# Patient Record
Sex: MALE | Race: WHITE | Employment: UNEMPLOYED | URBAN - METROPOLITAN AREA
[De-identification: names, ages, dates, MRNs, and addresses within clinical notes are randomized per-mention and may not be internally consistent; named-entity substitution may affect disease eponyms.]

---

## 2019-02-25 ENCOUNTER — ANESTHESIA EVENT (OUTPATIENT)
Dept: SURGERY | Age: 49
DRG: 718 | End: 2019-02-25
Payer: COMMERCIAL

## 2019-02-25 ENCOUNTER — ANESTHESIA (OUTPATIENT)
Dept: SURGERY | Age: 49
DRG: 718 | End: 2019-02-25
Payer: COMMERCIAL

## 2019-02-25 ENCOUNTER — APPOINTMENT (OUTPATIENT)
Dept: GENERAL RADIOLOGY | Age: 49
DRG: 718 | End: 2019-02-25
Attending: UROLOGY
Payer: COMMERCIAL

## 2019-02-25 ENCOUNTER — HOSPITAL ENCOUNTER (INPATIENT)
Age: 49
LOS: 1 days | Discharge: HOME | DRG: 718 | End: 2019-02-26
Attending: EMERGENCY MEDICINE | Admitting: HOSPITALIST
Payer: COMMERCIAL

## 2019-02-25 ENCOUNTER — APPOINTMENT (OUTPATIENT)
Dept: ULTRASOUND IMAGING | Age: 49
DRG: 718 | End: 2019-02-25
Attending: EMERGENCY MEDICINE
Payer: COMMERCIAL

## 2019-02-25 DIAGNOSIS — Q64.33: ICD-10-CM

## 2019-02-25 DIAGNOSIS — N45.3 ORCHITIS AND EPIDIDYMITIS: Primary | ICD-10-CM

## 2019-02-25 PROBLEM — N39.0 UTI (URINARY TRACT INFECTION): Status: ACTIVE | Noted: 2019-02-25

## 2019-02-25 LAB
ANION GAP SERPL CALC-SCNC: 8 MMOL/L (ref 3–18)
APPEARANCE UR: CLEAR
BACTERIA URNS QL MICRO: ABNORMAL /HPF
BASOPHILS # BLD: 0 K/UL (ref 0–0.1)
BASOPHILS NFR BLD: 0 % (ref 0–2)
BILIRUB UR QL: NEGATIVE
BUN SERPL-MCNC: 15 MG/DL (ref 7–18)
BUN/CREAT SERPL: 13 (ref 12–20)
CALCIUM SERPL-MCNC: 9.1 MG/DL (ref 8.5–10.1)
CHLORIDE SERPL-SCNC: 103 MMOL/L (ref 100–108)
CO2 SERPL-SCNC: 25 MMOL/L (ref 21–32)
COLOR UR: ABNORMAL
CREAT SERPL-MCNC: 1.13 MG/DL (ref 0.6–1.3)
DIFFERENTIAL METHOD BLD: ABNORMAL
EOSINOPHIL # BLD: 0.2 K/UL (ref 0–0.4)
EOSINOPHIL NFR BLD: 2 % (ref 0–5)
EPITH CASTS URNS QL MICRO: ABNORMAL /LPF (ref 0–5)
ERYTHROCYTE [DISTWIDTH] IN BLOOD BY AUTOMATED COUNT: 12.5 % (ref 11.6–14.5)
GLUCOSE SERPL-MCNC: 126 MG/DL (ref 74–99)
GLUCOSE UR STRIP.AUTO-MCNC: NEGATIVE MG/DL
HCT VFR BLD AUTO: 48.5 % (ref 36–48)
HGB BLD-MCNC: 16.4 G/DL (ref 13–16)
HGB UR QL STRIP: NEGATIVE
KETONES UR QL STRIP.AUTO: NEGATIVE MG/DL
LACTATE BLD-SCNC: 1.53 MMOL/L (ref 0.4–2)
LEUKOCYTE ESTERASE UR QL STRIP.AUTO: ABNORMAL
LYMPHOCYTES # BLD: 1 K/UL (ref 0.9–3.6)
LYMPHOCYTES NFR BLD: 11 % (ref 21–52)
MCH RBC QN AUTO: 29.4 PG (ref 24–34)
MCHC RBC AUTO-ENTMCNC: 33.8 G/DL (ref 31–37)
MCV RBC AUTO: 87.1 FL (ref 74–97)
MONOCYTES # BLD: 1 K/UL (ref 0.05–1.2)
MONOCYTES NFR BLD: 11 % (ref 3–10)
NEUTS SEG # BLD: 6.9 K/UL (ref 1.8–8)
NEUTS SEG NFR BLD: 76 % (ref 40–73)
NITRITE UR QL STRIP.AUTO: NEGATIVE
PH UR STRIP: 5.5 [PH] (ref 5–8)
PLATELET # BLD AUTO: 248 K/UL (ref 135–420)
PMV BLD AUTO: 9.5 FL (ref 9.2–11.8)
POTASSIUM SERPL-SCNC: 4.1 MMOL/L (ref 3.5–5.5)
PROT UR STRIP-MCNC: NEGATIVE MG/DL
RBC # BLD AUTO: 5.57 M/UL (ref 4.7–5.5)
RBC #/AREA URNS HPF: NEGATIVE /HPF (ref 0–5)
SODIUM SERPL-SCNC: 136 MMOL/L (ref 136–145)
SP GR UR REFRACTOMETRY: 1.01 (ref 1–1.03)
UROBILINOGEN UR QL STRIP.AUTO: 0.2 EU/DL (ref 0.2–1)
WBC # BLD AUTO: 9.1 K/UL (ref 4.6–13.2)
WBC URNS QL MICRO: ABNORMAL /HPF (ref 0–4)

## 2019-02-25 PROCEDURE — 76060000032 HC ANESTHESIA 0.5 TO 1 HR: Performed by: UROLOGY

## 2019-02-25 PROCEDURE — 77030027138 HC INCENT SPIROMETER -A

## 2019-02-25 PROCEDURE — 0TJB8ZZ INSPECTION OF BLADDER, VIA NATURAL OR ARTIFICIAL OPENING ENDOSCOPIC: ICD-10-PCS | Performed by: UROLOGY

## 2019-02-25 PROCEDURE — 80048 BASIC METABOLIC PNL TOTAL CA: CPT

## 2019-02-25 PROCEDURE — 65660000000 HC RM CCU STEPDOWN

## 2019-02-25 PROCEDURE — 74011250636 HC RX REV CODE- 250/636

## 2019-02-25 PROCEDURE — 77030018832 HC SOL IRR H20 ICUM -A: Performed by: UROLOGY

## 2019-02-25 PROCEDURE — 83605 ASSAY OF LACTIC ACID: CPT

## 2019-02-25 PROCEDURE — 0T7D0ZZ DILATION OF URETHRA, OPEN APPROACH: ICD-10-PCS | Performed by: UROLOGY

## 2019-02-25 PROCEDURE — 74011000250 HC RX REV CODE- 250: Performed by: EMERGENCY MEDICINE

## 2019-02-25 PROCEDURE — 77030020782 HC GWN BAIR PAWS FLX 3M -B: Performed by: UROLOGY

## 2019-02-25 PROCEDURE — 77030034696 HC CATH URETH FOL 2W BARD -A: Performed by: UROLOGY

## 2019-02-25 PROCEDURE — 76870 US EXAM SCROTUM: CPT

## 2019-02-25 PROCEDURE — 87086 URINE CULTURE/COLONY COUNT: CPT

## 2019-02-25 PROCEDURE — 77030012961 HC IRR KT CYSTO/TUR ICUM -A: Performed by: UROLOGY

## 2019-02-25 PROCEDURE — 77030031139 HC SUT VCRL2 J&J -A: Performed by: UROLOGY

## 2019-02-25 PROCEDURE — 51798 US URINE CAPACITY MEASURE: CPT

## 2019-02-25 PROCEDURE — 77030013553 HC DIL URETH MEATAL COOK -C: Performed by: UROLOGY

## 2019-02-25 PROCEDURE — 76010000138 HC OR TIME 0.5 TO 1 HR: Performed by: UROLOGY

## 2019-02-25 PROCEDURE — 74011250636 HC RX REV CODE- 250/636: Performed by: HOSPITALIST

## 2019-02-25 PROCEDURE — 81001 URINALYSIS AUTO W/SCOPE: CPT

## 2019-02-25 PROCEDURE — 99283 EMERGENCY DEPT VISIT LOW MDM: CPT

## 2019-02-25 PROCEDURE — 0T9B70Z DRAINAGE OF BLADDER WITH DRAINAGE DEVICE, VIA NATURAL OR ARTIFICIAL OPENING: ICD-10-PCS | Performed by: UROLOGY

## 2019-02-25 PROCEDURE — 76210000006 HC OR PH I REC 0.5 TO 1 HR: Performed by: UROLOGY

## 2019-02-25 PROCEDURE — 74011000250 HC RX REV CODE- 250

## 2019-02-25 PROCEDURE — 87040 BLOOD CULTURE FOR BACTERIA: CPT

## 2019-02-25 PROCEDURE — 51703 INSERT BLADDER CATH COMPLEX: CPT

## 2019-02-25 PROCEDURE — 77030010509 HC AIRWY LMA MSK TELE -A: Performed by: ANESTHESIOLOGY

## 2019-02-25 PROCEDURE — 74011250636 HC RX REV CODE- 250/636: Performed by: EMERGENCY MEDICINE

## 2019-02-25 PROCEDURE — 85025 COMPLETE CBC W/AUTO DIFF WBC: CPT

## 2019-02-25 RX ORDER — PROPOFOL 10 MG/ML
INJECTION, EMULSION INTRAVENOUS AS NEEDED
Status: DISCONTINUED | OUTPATIENT
Start: 2019-02-25 | End: 2019-02-25 | Stop reason: HOSPADM

## 2019-02-25 RX ORDER — LEVOFLOXACIN 5 MG/ML
750 INJECTION, SOLUTION INTRAVENOUS EVERY 24 HOURS
Status: DISCONTINUED | OUTPATIENT
Start: 2019-02-25 | End: 2019-02-25

## 2019-02-25 RX ORDER — SODIUM CHLORIDE 9 MG/ML
100 INJECTION, SOLUTION INTRAVENOUS CONTINUOUS
Status: DISCONTINUED | OUTPATIENT
Start: 2019-02-25 | End: 2019-02-26 | Stop reason: HOSPADM

## 2019-02-25 RX ORDER — FENTANYL CITRATE 50 UG/ML
50 INJECTION, SOLUTION INTRAMUSCULAR; INTRAVENOUS
Status: DISCONTINUED | OUTPATIENT
Start: 2019-02-25 | End: 2019-02-25 | Stop reason: HOSPADM

## 2019-02-25 RX ORDER — ONDANSETRON 2 MG/ML
4 INJECTION INTRAMUSCULAR; INTRAVENOUS ONCE
Status: DISCONTINUED | OUTPATIENT
Start: 2019-02-25 | End: 2019-02-25 | Stop reason: HOSPADM

## 2019-02-25 RX ORDER — DOXYCYCLINE 100 MG/1
100 CAPSULE ORAL 2 TIMES DAILY
Qty: 28 CAP | Refills: 0 | Status: SHIPPED | OUTPATIENT
Start: 2019-02-25 | End: 2019-02-26 | Stop reason: SDUPTHER

## 2019-02-25 RX ORDER — DIPHENHYDRAMINE HYDROCHLORIDE 50 MG/ML
12.5 INJECTION, SOLUTION INTRAMUSCULAR; INTRAVENOUS
Status: DISCONTINUED | OUTPATIENT
Start: 2019-02-25 | End: 2019-02-25 | Stop reason: HOSPADM

## 2019-02-25 RX ORDER — LIDOCAINE HYDROCHLORIDE 20 MG/ML
JELLY TOPICAL ONCE
Status: COMPLETED | OUTPATIENT
Start: 2019-02-25 | End: 2019-02-25

## 2019-02-25 RX ORDER — SODIUM CHLORIDE 0.9 % (FLUSH) 0.9 %
5-40 SYRINGE (ML) INJECTION AS NEEDED
Status: DISCONTINUED | OUTPATIENT
Start: 2019-02-25 | End: 2019-02-25 | Stop reason: HOSPADM

## 2019-02-25 RX ORDER — DEXAMETHASONE SODIUM PHOSPHATE 4 MG/ML
INJECTION, SOLUTION INTRA-ARTICULAR; INTRALESIONAL; INTRAMUSCULAR; INTRAVENOUS; SOFT TISSUE AS NEEDED
Status: DISCONTINUED | OUTPATIENT
Start: 2019-02-25 | End: 2019-02-25 | Stop reason: HOSPADM

## 2019-02-25 RX ORDER — SODIUM CHLORIDE, SODIUM LACTATE, POTASSIUM CHLORIDE, CALCIUM CHLORIDE 600; 310; 30; 20 MG/100ML; MG/100ML; MG/100ML; MG/100ML
INJECTION, SOLUTION INTRAVENOUS
Status: DISCONTINUED | OUTPATIENT
Start: 2019-02-25 | End: 2019-02-25 | Stop reason: HOSPADM

## 2019-02-25 RX ORDER — FENTANYL CITRATE 50 UG/ML
INJECTION, SOLUTION INTRAMUSCULAR; INTRAVENOUS AS NEEDED
Status: DISCONTINUED | OUTPATIENT
Start: 2019-02-25 | End: 2019-02-25 | Stop reason: HOSPADM

## 2019-02-25 RX ORDER — ONDANSETRON 2 MG/ML
4 INJECTION INTRAMUSCULAR; INTRAVENOUS
Status: DISCONTINUED | OUTPATIENT
Start: 2019-02-25 | End: 2019-02-26 | Stop reason: HOSPADM

## 2019-02-25 RX ORDER — MIDAZOLAM HYDROCHLORIDE 1 MG/ML
INJECTION, SOLUTION INTRAMUSCULAR; INTRAVENOUS AS NEEDED
Status: DISCONTINUED | OUTPATIENT
Start: 2019-02-25 | End: 2019-02-25 | Stop reason: HOSPADM

## 2019-02-25 RX ORDER — SODIUM CHLORIDE 0.9 % (FLUSH) 0.9 %
5-10 SYRINGE (ML) INJECTION AS NEEDED
Status: DISCONTINUED | OUTPATIENT
Start: 2019-02-25 | End: 2019-02-26 | Stop reason: HOSPADM

## 2019-02-25 RX ORDER — LIDOCAINE HYDROCHLORIDE 20 MG/ML
INJECTION, SOLUTION EPIDURAL; INFILTRATION; INTRACAUDAL; PERINEURAL AS NEEDED
Status: DISCONTINUED | OUTPATIENT
Start: 2019-02-25 | End: 2019-02-25 | Stop reason: HOSPADM

## 2019-02-25 RX ORDER — ONDANSETRON 2 MG/ML
INJECTION INTRAMUSCULAR; INTRAVENOUS AS NEEDED
Status: DISCONTINUED | OUTPATIENT
Start: 2019-02-25 | End: 2019-02-25 | Stop reason: HOSPADM

## 2019-02-25 RX ORDER — OXYCODONE AND ACETAMINOPHEN 5; 325 MG/1; MG/1
1 TABLET ORAL
Status: DISCONTINUED | OUTPATIENT
Start: 2019-02-25 | End: 2019-02-26 | Stop reason: HOSPADM

## 2019-02-25 RX ORDER — FAMOTIDINE 20 MG/1
20 TABLET, FILM COATED ORAL ONCE
Status: DISCONTINUED | OUTPATIENT
Start: 2019-02-25 | End: 2019-02-25 | Stop reason: HOSPADM

## 2019-02-25 RX ORDER — SODIUM CHLORIDE, SODIUM LACTATE, POTASSIUM CHLORIDE, CALCIUM CHLORIDE 600; 310; 30; 20 MG/100ML; MG/100ML; MG/100ML; MG/100ML
100 INJECTION, SOLUTION INTRAVENOUS CONTINUOUS
Status: DISCONTINUED | OUTPATIENT
Start: 2019-02-25 | End: 2019-02-25 | Stop reason: HOSPADM

## 2019-02-25 RX ORDER — GLYCOPYRROLATE 0.2 MG/ML
INJECTION INTRAMUSCULAR; INTRAVENOUS AS NEEDED
Status: DISCONTINUED | OUTPATIENT
Start: 2019-02-25 | End: 2019-02-25 | Stop reason: HOSPADM

## 2019-02-25 RX ORDER — SODIUM CHLORIDE 0.9 % (FLUSH) 0.9 %
5-40 SYRINGE (ML) INJECTION EVERY 8 HOURS
Status: DISCONTINUED | OUTPATIENT
Start: 2019-02-25 | End: 2019-02-25 | Stop reason: HOSPADM

## 2019-02-25 RX ORDER — ACETAMINOPHEN 325 MG/1
650 TABLET ORAL
Status: DISCONTINUED | OUTPATIENT
Start: 2019-02-25 | End: 2019-02-26 | Stop reason: HOSPADM

## 2019-02-25 RX ADMIN — LIDOCAINE HYDROCHLORIDE 100 MG: 20 INJECTION, SOLUTION EPIDURAL; INFILTRATION; INTRACAUDAL; PERINEURAL at 19:42

## 2019-02-25 RX ADMIN — PROPOFOL 200 MG: 10 INJECTION, EMULSION INTRAVENOUS at 19:42

## 2019-02-25 RX ADMIN — ONDANSETRON 4 MG: 2 INJECTION INTRAMUSCULAR; INTRAVENOUS at 19:39

## 2019-02-25 RX ADMIN — CEFEPIME 2 G: 2 INJECTION, POWDER, FOR SOLUTION INTRAVENOUS at 18:50

## 2019-02-25 RX ADMIN — GLYCOPYRROLATE 0.2 MG: 0.2 INJECTION INTRAMUSCULAR; INTRAVENOUS at 19:39

## 2019-02-25 RX ADMIN — SODIUM CHLORIDE 100 ML/HR: 900 INJECTION, SOLUTION INTRAVENOUS at 21:41

## 2019-02-25 RX ADMIN — LIDOCAINE HYDROCHLORIDE: 20 JELLY TOPICAL at 18:10

## 2019-02-25 RX ADMIN — FENTANYL CITRATE 50 MCG: 50 INJECTION, SOLUTION INTRAMUSCULAR; INTRAVENOUS at 19:42

## 2019-02-25 RX ADMIN — DEXAMETHASONE SODIUM PHOSPHATE 4 MG: 4 INJECTION, SOLUTION INTRA-ARTICULAR; INTRALESIONAL; INTRAMUSCULAR; INTRAVENOUS; SOFT TISSUE at 19:43

## 2019-02-25 RX ADMIN — MIDAZOLAM HYDROCHLORIDE 2 MG: 1 INJECTION, SOLUTION INTRAMUSCULAR; INTRAVENOUS at 19:39

## 2019-02-25 RX ADMIN — FENTANYL CITRATE 50 MCG: 50 INJECTION, SOLUTION INTRAMUSCULAR; INTRAVENOUS at 19:56

## 2019-02-25 RX ADMIN — SODIUM CHLORIDE, SODIUM LACTATE, POTASSIUM CHLORIDE, CALCIUM CHLORIDE: 600; 310; 30; 20 INJECTION, SOLUTION INTRAVENOUS at 19:39

## 2019-02-25 NOTE — ED PROVIDER NOTES
EMERGENCY DEPARTMENT HISTORY AND PHYSICAL EXAM 
 
1:01 PM 
 
 
Date: 2/25/2019 Patient Name: Corine Munguia History of Presenting Illness Chief Complaint Patient presents with  Testicle Swelling History Provided By: Patient Additional History (Context): Corine Munguia is a 57 y.o. male presents with acute scrotal swelling onset 1 day. Pain located to right testicle. He states it feels like a \"cyst\". Pain worsened with palpation to scrotum. Admits involvement with heavy lifting and concern for hernia. No pertinent hx or other concerns at this time. PCP: Darby Villeda MD 
 
Chief Complaint: Swelling Duration:  1 day Timing:  acute Location: Right scrotum Quality: cyst-like Severity: none reported Modifying Factors: Pain worsened with palpation. Associated Symptoms: None. Current Outpatient Medications Medication Sig Dispense Refill  metoprolol succinate (TOPROL XL) 100 mg XL tablet Take  by mouth daily.  amLODIPine (NORVASC) 10 mg tablet Take 10 mg by mouth daily.  oxyCODONE-acetaminophen (PERCOCET) 5-325 mg per tablet Take 1 Tab by mouth every four (4) hours as needed for Pain. 10 Tab 0 Past History Past Medical History: 
Past Medical History:  
Diagnosis Date  Hypertension Past Surgical History: 
Past Surgical History:  
Procedure Laterality Date  HX UROLOGICAL Family History: 
History reviewed. No pertinent family history. Social History: 
Social History Tobacco Use  Smoking status: Never Smoker  Smokeless tobacco: Never Used Substance Use Topics  Alcohol use: Yes Comment: occasionally  Drug use: No  
 
 
Allergies: Allergies Allergen Reactions  Ciprofloxacin Nausea and Vomiting Review of Systems Review of Systems Constitutional: Negative for diaphoresis and fever. HENT: Negative for congestion and sore throat. Eyes: Negative for pain and itching. Respiratory: Negative for cough and shortness of breath. Cardiovascular: Negative for chest pain and palpitations. Gastrointestinal: Negative for abdominal pain and diarrhea. Endocrine: Negative for polydipsia and polyuria. Genitourinary: Positive for scrotal swelling. Negative for dysuria, hematuria, penile pain and penile swelling. Musculoskeletal: Negative for arthralgias and myalgias. Skin: Negative for rash and wound. Neurological: Negative for seizures and syncope. Hematological: Does not bruise/bleed easily. Psychiatric/Behavioral: Negative for agitation and hallucinations. Physical Exam  
 
Patient Vitals for the past 12 hrs: 
 Temp Pulse Resp BP SpO2  
02/25/19 1236 98 °F (36.7 °C) 100 16 (!) 146/97 100 % Physical Exam  
Constitutional: He appears well-developed and well-nourished. HENT:  
Head: Normocephalic and atraumatic. Eyes: Conjunctivae are normal. No scleral icterus. Neck: Normal range of motion. Neck supple. No JVD present. Cardiovascular: Normal rate, regular rhythm and intact distal pulses. Pulmonary/Chest: Effort normal. No respiratory distress. Genitourinary: Penis normal. Right testis shows swelling (slight). Right testis shows no mass. Left testis shows no mass. Circumcised. Genitourinary Comments: No focal area of severe tenderness but seems centered at spermatic cord. Musculoskeletal: Normal range of motion. Neurological: He is alert. Skin: Skin is warm and dry. Psychiatric: Judgment and thought content normal.  
Nursing note and vitals reviewed. Diagnostic Study Results Labs - No results found for this or any previous visit (from the past 12 hour(s)). Radiologic Studies -  
US SCROTUM/TESTICLES Final Result IMPRESSION:   
 Markedly heterogeneous left testicle with diminished vascularity compared to  
the contralateral testicle but some slight arterial and venous waveforms are  
 identified in the left testicle. Sequela of prior infarction or  
intermittent/partial torsion are possible. The left epididymis is also  
heterogeneous in appearance. Right epididymal head cyst measuring up to 8 mm. Bilateral hydroceles, right greater than left. Us Scrotum/testicles Result Date: 2/25/2019 SCROTAL ULTRASOUND HISTORY: Right-sided scrotal swelling and pain. COMPARISON: None. TECHNIQUE:  Sonography and spectral Doppler imaging of the scrotal contents was performed. Images were obtained and stored in a permanent archive. RESULT: RIGHT TESTIS:       Size: 4.8 x 2.9 x 3.4  cm. Echotexture: Homogeneous       Microcalcifications: absent       Mass: None. Vascularity: Normal arterial and venous flow with normal spectral waveforms. LEFT TESTIS:      Size:  3.7 x 2.4 x 2.5  cm. Echotexture: Extremely heterogeneous with multiple areas of decreased hypoechogenicity      Microcalcifications: absent      Mass: None. Vascularity: There is less vascularity of the left testicle, but there are some arterial and venous waveforms identified. EPIDIDYMIDES: There is an 8 x 7 x 6 mm right epididymal head cyst. The left epididymis is heterogeneous. HYDROCELE: Bilateral hydroceles, right greater than left. VARICOCELE: none IMPRESSION:  Markedly heterogeneous left testicle with diminished vascularity compared to the contralateral testicle but some slight arterial and venous waveforms are identified in the left testicle. Sequela of prior infarction or intermittent/partial torsion are possible. The left epididymis is also heterogeneous in appearance. Right epididymal head cyst measuring up to 8 mm. Bilateral hydroceles, right greater than left. Medications ordered:  
Medications - No data to display Medical Decision Making Initial Medical Decision Making and DDx: MDM: No mass. No exquisite tenderness.  Focused around epididymis, possibly epididymitis. Doubt testicular torsion or hernia. ED Course: Progress Notes, Reevaluation, and Consults: 
   
 
 
3:36 PM: Imaging returned. Concern for partial torsion. Discussed pt's case with urology. Will come down and evaluate pt. 
 
6:02 PM : Pt care transferred to Dr. Carmencita Coleman  ,ED provider. History of patient complaint(s), available diagnostic reports and current treatment plan has been discussed thoroughly. Bedside rounding on patient occured : no . Intended disposition of patient : Plan to DC with Doxycycline and indwelling catheter. Pending diagnostics reports and/or labs (please list): Urinary catheter and labs. I am the first provider for this patient. I reviewed the vital signs, available nursing notes, past medical history, past surgical history, family history and social history. Vital Signs-Reviewed the patient's vital signs. Pulse Oximetry Analysis - 100% on RA. Wnl. Records Reviewed: Nursing Notes and Old Medical Records (Time of Review: 1:01 PM) Diagnosis Clinical Impression: No diagnosis found. Disposition:  
 
Follow-up Information None Medication List  
  
ASK your doctor about these medications NORVASC 10 mg tablet Generic drug:  amLODIPine 
  
oxyCODONE-acetaminophen 5-325 mg per tablet Commonly known as:  PERCOCET Take 1 Tab by mouth every four (4) hours as needed for Pain. TOPROL  mg tablet Generic drug:  metoprolol succinate 
  
  
 
_______________________________ Attestations: 
Scribe Attestation Alfredo Christie acting as a scribe for and in the presence of Nova Olivo MD     
February 25, 2019 at 1:09 PM 
    
Provider Attestation:     
I personally performed the services described in the documentation, reviewed the documentation, as recorded by the scribe in my presence, and it accurately and completely records my words and actions.  February 25, 2019 at 1:09 PM - Nova Olivo MD   
 _______________________________

## 2019-02-25 NOTE — ED NOTES
Pt reports minor irritation from swollen scrotum, states it isn't really painful - just annoying, sitting in position of comfort.

## 2019-02-25 NOTE — ED NOTES
1800 :Pt care assumed from Yvette Douglas , ED provider. Pt complaint(s), current treatment plan, progression and available diagnostic results have been discussed thoroughly. Rounding occurred: Yes Intended Disposition: TBD Pending diagnostic reports and/or labs (please list): going to OR for suprapubic catheter placement. Progress Notes:  
Consult:  Discussed care with Dr. Adrianne Fortune, Urology. Standard discussion; including history of patients chief complaint, available diagnostic results, and treatment course. Going to take the patient to the OR for a suprapubic catheter placement. Clinical impression will be epididymal orchitis and complex UTI. Would like the patient started on ceftriaxone and admitted to the hospitalist.  
 
6:28 PM - Consult:  Discussed care with Dr. Maryann Álvarez, Hospitalist. Standard discussion; including history of patients chief complaint, available diagnostic results, and treatment course. Will accept patient for admission. Decision to hospitalize the patient made at 6:29 PM 2/25/2019 Disposition:  
Admit Attestations: 
 
Scribe Attestation Gordon Malcolm acting as a scribe for and in the presence of [employer] February 25, 2019 at 6:19 PM 
    
Provider Attestation:     
I personally performed the services described in the documentation, reviewed the documentation, as recorded by the scribe in my presence, and it accurately and completely records my words and actions.  February 25, 2019 at 6:19 PM - [employer], DO

## 2019-02-26 VITALS
HEIGHT: 72 IN | OXYGEN SATURATION: 96 % | SYSTOLIC BLOOD PRESSURE: 115 MMHG | RESPIRATION RATE: 16 BRPM | HEART RATE: 75 BPM | BODY MASS INDEX: 26.82 KG/M2 | DIASTOLIC BLOOD PRESSURE: 67 MMHG | WEIGHT: 198 LBS | TEMPERATURE: 98.6 F

## 2019-02-26 LAB
ALBUMIN SERPL-MCNC: 3.5 G/DL (ref 3.4–5)
ALBUMIN/GLOB SERPL: 0.9 {RATIO} (ref 0.8–1.7)
ALP SERPL-CCNC: 85 U/L (ref 45–117)
ALT SERPL-CCNC: 40 U/L (ref 16–61)
ANION GAP SERPL CALC-SCNC: 5 MMOL/L (ref 3–18)
APTT PPP: 28.6 SEC (ref 23–36.4)
AST SERPL-CCNC: 20 U/L (ref 15–37)
BILIRUB SERPL-MCNC: 0.8 MG/DL (ref 0.2–1)
BUN SERPL-MCNC: 15 MG/DL (ref 7–18)
BUN/CREAT SERPL: 15 (ref 12–20)
CALCIUM SERPL-MCNC: 8.6 MG/DL (ref 8.5–10.1)
CHLORIDE SERPL-SCNC: 106 MMOL/L (ref 100–108)
CHOLEST SERPL-MCNC: 212 MG/DL
CO2 SERPL-SCNC: 26 MMOL/L (ref 21–32)
CREAT SERPL-MCNC: 1.03 MG/DL (ref 0.6–1.3)
ERYTHROCYTE [DISTWIDTH] IN BLOOD BY AUTOMATED COUNT: 12.5 % (ref 11.6–14.5)
GLOBULIN SER CALC-MCNC: 4 G/DL (ref 2–4)
GLUCOSE SERPL-MCNC: 130 MG/DL (ref 74–99)
HCT VFR BLD AUTO: 44.9 % (ref 36–48)
HDLC SERPL-MCNC: 62 MG/DL (ref 40–60)
HDLC SERPL: 3.4 {RATIO} (ref 0–5)
HGB BLD-MCNC: 15.6 G/DL (ref 13–16)
LACTATE SERPL-SCNC: 1.1 MMOL/L (ref 0.4–2)
LDLC SERPL CALC-MCNC: 127 MG/DL (ref 0–100)
LIPID PROFILE,FLP: ABNORMAL
MCH RBC QN AUTO: 30.3 PG (ref 24–34)
MCHC RBC AUTO-ENTMCNC: 34.7 G/DL (ref 31–37)
MCV RBC AUTO: 87.2 FL (ref 74–97)
PLATELET # BLD AUTO: 197 K/UL (ref 135–420)
PMV BLD AUTO: 9.5 FL (ref 9.2–11.8)
POTASSIUM SERPL-SCNC: 4.7 MMOL/L (ref 3.5–5.5)
PROT SERPL-MCNC: 7.5 G/DL (ref 6.4–8.2)
RBC # BLD AUTO: 5.15 M/UL (ref 4.7–5.5)
SODIUM SERPL-SCNC: 137 MMOL/L (ref 136–145)
TRIGL SERPL-MCNC: 115 MG/DL (ref ?–150)
VLDLC SERPL CALC-MCNC: 23 MG/DL
WBC # BLD AUTO: 6.6 K/UL (ref 4.6–13.2)

## 2019-02-26 PROCEDURE — 74011250636 HC RX REV CODE- 250/636: Performed by: EMERGENCY MEDICINE

## 2019-02-26 PROCEDURE — 85027 COMPLETE CBC AUTOMATED: CPT

## 2019-02-26 PROCEDURE — 85730 THROMBOPLASTIN TIME PARTIAL: CPT

## 2019-02-26 PROCEDURE — 36415 COLL VENOUS BLD VENIPUNCTURE: CPT

## 2019-02-26 PROCEDURE — 74011000250 HC RX REV CODE- 250: Performed by: EMERGENCY MEDICINE

## 2019-02-26 PROCEDURE — 80053 COMPREHEN METABOLIC PANEL: CPT

## 2019-02-26 PROCEDURE — 80061 LIPID PANEL: CPT

## 2019-02-26 RX ORDER — OXYCODONE AND ACETAMINOPHEN 5; 325 MG/1; MG/1
1 TABLET ORAL
Qty: 10 TAB | Refills: 0 | Status: SHIPPED | OUTPATIENT
Start: 2019-02-26 | End: 2019-03-01

## 2019-02-26 RX ORDER — DOXYCYCLINE 100 MG/1
100 CAPSULE ORAL 2 TIMES DAILY
Qty: 28 CAP | Refills: 0 | Status: SHIPPED | OUTPATIENT
Start: 2019-02-26 | End: 2019-03-12

## 2019-02-26 RX ORDER — OXYCODONE AND ACETAMINOPHEN 5; 325 MG/1; MG/1
1 TABLET ORAL
Qty: 10 TAB | Refills: 0 | Status: SHIPPED | OUTPATIENT
Start: 2019-02-26 | End: 2019-02-26 | Stop reason: SDUPTHER

## 2019-02-26 RX ADMIN — CEFEPIME 2 G: 2 INJECTION, POWDER, FOR SOLUTION INTRAVENOUS at 10:03

## 2019-02-26 RX ADMIN — CEFEPIME 2 G: 2 INJECTION, POWDER, FOR SOLUTION INTRAVENOUS at 02:36

## 2019-02-26 NOTE — H&P
HISTORY & PHYSICAL Patient: Florence Sierra MRN: [id number]  CSN: [id number] YOB: 1970  Age: 57 y.o. Sex: male DOA: 2/25/2019 LOS:  LOS: 0 days DOA: 2/25/2019 Assessment/Plan Active Problems: 
  Orchitis and epididymitis (2/25/2019) UTI (urinary tract infection) (2/25/2019) Plan: 1. UTI - IV Abx , IVF 2. Orchitis with R testicular swelling  - IV Abx , Urology consulted 3. Urinary tract obstruction with meatal stenosis - will get Suprapubic cath per urology 4. H/o HTN - holding BP meds since his BP is running low DVT Px - SCD  
FC Severity of Signs & Symptoms -- Moderate Risk of adverse events -- Moderate Current Medical Rx Plan - As Above Patient history & comorbidities - Per HPI Discharge Plan -- Home Risk for Readmission -- Low HPI:  
 
Florence Sierra is a 57 y.o. male who is being admitted for Orchitis - R testicular swelling & UTI & also urinary tract obstruction. Pt came to the ER today for R testicular swelling & pain -- mentions it started earlier today & he says he wasn't having difficulty urinating but in the ER bladder volume post void was 680ml He was seen by urology & noted to have orchitis with UTI Pt mentions he has had urinary tract reconstructive surg in the past >16 yrs ago He also has a h/o HTN Per urology pt will get Suprapubic cath placed today Pt is being admitted to the hosp for further eval  
 
Past Medical History:  
Diagnosis Date  Hypertension Past Surgical History:  
Procedure Laterality Date  HX UROLOGICAL History reviewed. No pertinent family history. Social History Socioeconomic History  Marital status: LEGALLY  Spouse name: Not on file  Number of children: Not on file  Years of education: Not on file  Highest education level: Not on file Tobacco Use  Smoking status: Never Smoker  Smokeless tobacco: Never Used Substance and Sexual Activity  Alcohol use: Yes Comment: occasionally  Drug use: No  
 
 
Prior to Admission medications Medication Sig Start Date End Date Taking? Authorizing Provider  
doxycycline (MONODOX) 100 mg capsule Take 1 Cap by mouth two (2) times a day for 14 days. 2/25/19 3/11/19 Yes Ken TOBIAS MD  
metoprolol succinate (TOPROL XL) 100 mg XL tablet Take  by mouth daily. Yes Blanche Lundy MD  
amLODIPine (NORVASC) 10 mg tablet Take 10 mg by mouth daily. Yes Blanche Lundy MD  
 
 
Allergies Allergen Reactions  Ciprofloxacin Nausea and Vomiting Review of Systems A comprehensive review of systems was negative except for that written in the History of Present Illness. Physical Exam:  
  
Visit Vitals /78 (BP 1 Location: Left arm, BP Patient Position: At rest) Pulse 82 Temp 97.1 °F (36.2 °C) Resp 17 Ht 6' (1.829 m) Wt 89.8 kg (198 lb) SpO2 100% BMI 26.85 kg/m² Physical Exam: 
 
Gen: In general, this is a well nourished male in no acute distress HEENT: Sclerae nonicteric. Oral mucous membranes moist. Dentition normal 
Neck: Supple with midline trachea. CV: RRR without murmur or rub appreciated. Resp:Respirations are unlabored without use of accessory muscles. Lung fields B/L without wheezes or rhonchi. Abd: Soft, nontender, nondistended. Extrem: Extremities are warm, without cyanosis or clubbing. No pitting pretibial edema. Skin: Warm, no visible rashes. Neuro: Patient is alert, oriented, and cooperative. No obvious focal defects. Moves all 4 extremities. Labs Reviewed: 
 
Recent Results (from the past 24 hour(s)) URINALYSIS W/ RFLX MICROSCOPIC Collection Time: 02/25/19  5:28 PM  
Result Value Ref Range Color DARK YELLOW Appearance CLEAR Specific gravity 1.011 1.005 - 1.030    
 pH (UA) 5.5 5.0 - 8.0 Protein NEGATIVE  NEG mg/dL Glucose NEGATIVE  NEG mg/dL Ketone NEGATIVE  NEG mg/dL Bilirubin NEGATIVE  NEG Blood NEGATIVE  NEG Urobilinogen 0.2 0.2 - 1.0 EU/dL Nitrites NEGATIVE  NEG Leukocyte Esterase MODERATE (A) NEG URINE MICROSCOPIC ONLY Collection Time: 02/25/19  5:28 PM  
Result Value Ref Range WBC 20 to 35 0 - 4 /hpf  
 RBC NEGATIVE  0 - 5 /hpf Epithelial cells FEW 0 - 5 /lpf Bacteria FEW (A) NEG /hpf METABOLIC PANEL, BASIC Collection Time: 02/25/19  5:40 PM  
Result Value Ref Range Sodium 136 136 - 145 mmol/L Potassium 4.1 3.5 - 5.5 mmol/L Chloride 103 100 - 108 mmol/L  
 CO2 25 21 - 32 mmol/L Anion gap 8 3.0 - 18 mmol/L Glucose 126 (H) 74 - 99 mg/dL BUN 15 7.0 - 18 MG/DL Creatinine 1.13 0.6 - 1.3 MG/DL  
 BUN/Creatinine ratio 13 12 - 20 GFR est AA >60 >60 ml/min/1.73m2 GFR est non-AA >60 >60 ml/min/1.73m2 Calcium 9.1 8.5 - 10.1 MG/DL  
CBC WITH AUTOMATED DIFF Collection Time: 02/25/19  5:40 PM  
Result Value Ref Range WBC 9.1 4.6 - 13.2 K/uL  
 RBC 5.57 (H) 4.70 - 5.50 M/uL  
 HGB 16.4 (H) 13.0 - 16.0 g/dL HCT 48.5 (H) 36.0 - 48.0 % MCV 87.1 74.0 - 97.0 FL  
 MCH 29.4 24.0 - 34.0 PG  
 MCHC 33.8 31.0 - 37.0 g/dL  
 RDW 12.5 11.6 - 14.5 % PLATELET 909 269 - 319 K/uL MPV 9.5 9.2 - 11.8 FL  
 NEUTROPHILS 76 (H) 40 - 73 % LYMPHOCYTES 11 (L) 21 - 52 % MONOCYTES 11 (H) 3 - 10 % EOSINOPHILS 2 0 - 5 % BASOPHILS 0 0 - 2 %  
 ABS. NEUTROPHILS 6.9 1.8 - 8.0 K/UL  
 ABS. LYMPHOCYTES 1.0 0.9 - 3.6 K/UL  
 ABS. MONOCYTES 1.0 0.05 - 1.2 K/UL  
 ABS. EOSINOPHILS 0.2 0.0 - 0.4 K/UL  
 ABS. BASOPHILS 0.0 0.0 - 0.1 K/UL  
 DF AUTOMATED    
POC LACTIC ACID Collection Time: 02/25/19  6:57 PM  
Result Value Ref Range Lactic Acid (POC) 1.53 0.40 - 2.00 mmol/L Imaging Reviewed: 
 
US Scrotum & testis IMPRESSION:  
 Markedly heterogeneous left testicle with diminished vascularity compared to 
the contralateral testicle but some slight arterial and venous waveforms are 
 identified in the left testicle. Sequela of prior infarction or 
intermittent/partial torsion are possible. The left epididymis is also 
heterogeneous in appearance.  
  
 Right epididymal head cyst measuring up to 8 mm.  
  
Bilateral hydroceles, right greater than left.  
 
 
 
Holly Dawkins MD 
2/25/2019, 7:19 PM

## 2019-02-26 NOTE — PERIOP NOTES
TRANSFER - OUT REPORT: 
 
Verbal report given to [address] on Nurys Cornea  being transferred to [facility] for routine post - op Report consisted of patients Situation, Background, Assessment and  
Recommendations(SBAR). Information from the following report(s) SBAR, OR Summary, Procedure Summary, Intake/Output, MAR and Recent Results was reviewed with the receiving nurse. Lines:  
Peripheral IV 02/25/19 Right;Upper Arm (Active) Site Assessment Clean, dry, & intact 2/25/2019  8:31 PM  
Phlebitis Assessment 0 2/25/2019  8:31 PM  
Infiltration Assessment 0 2/25/2019  8:31 PM  
Dressing Status Clean, dry, & intact 2/25/2019  8:31 PM  
Dressing Type Transparent 2/25/2019  8:31 PM  
Hub Color/Line Status Pink; Infusing;Patent 2/25/2019  8:31 PM  
  
 
Opportunity for questions and clarification was provided. Patient transported with: 
 Registered Nurse

## 2019-02-26 NOTE — PROGRESS NOTES
[address] [facility], [facility] Phone: [phone] Urology Daily Progress Note Patient Active Problem List  
Diagnosis Code  Acute urinary retention R33.8  Urinary retention R33.9  Meatal stenosis [id number]  Orchitis and epididymitis N45.3  
 UTI (urinary tract infection) N39.0 Assessment & Plan Assessment Epididymo-orchitis - On cefepime UTI - afebrile, VSS Urinary retention- with > 600mL in bladder, unable to place francisco at bedside patient doesn't tolerate Meatal Stenosis- Reoccurred. S/p Meatotomy 2/25/19  
          - s/p Blandy meatoplasty in 2003 by Dr. Naif Boyle and s/p dialation in 2014 Hx of Urinary retention with JOELLE. Patient is hypertensive and Tachycardic 
  
Plan: 
Greatly appreciate medicine admitting and managing patient Ucx and Bcx pending. Maintain Francisco x 3 days. Will make arrangements for VT as outpt Ok to discharge home from a urological standpoint on 2 weeks of doxy 100 mg BID Needs f/up with our reconstructive team with urine flow and possible RUG in 6 weeks Follow Up arranged: YES message sent to Shira Quiet to arrange f/up with Recon in 6 weeks and for VT in 3 days Will sign off at this time. Please contact with any questions or concerns Bonnie Young [facility] Pager # [phone] Available M-F 7:30- 5pm .  After 5pm and weekends please call answering service at [phone] Subjective \"I feel great. When can I go home. Only pain if tug on francisco\" Objective PHYSICAL EXAMINATION:  
Visit Vitals /71 Pulse 77 Temp 97.7 °F (36.5 °C) Resp 16 Ht 6' (1.829 m) Wt 198 lb (89.8 kg) SpO2 95% BMI 26.85 kg/m²  
 
  
Constitutional: Well developed, well nourished male. No acute distress. HEENT: Normocephalic, Atraumatic CV:  RRR Pulm: No respiratory distress or difficulties breathing GI:  No abdominal masses or tenderness. :  No CVA tenderness. Sahni- Draining clear yellow urine ANKIT:Perineum normal to visual inspection, no erythema or irritation: Patient deferred rectal exam  
SCROTUM:  No scrotal rash or lesions noticed. Normal bilateral testes and epididymis. PENIS: incision healing well, sutures intact No urethral discharge. Circumcised Skin: No evidence of jaundice. Normal color Neuro/Psych:  Alert and oriented. Affect appropriate. Lymphatic:   No inguinal lymphadenopathy MSK: FROM 
 
 
 
REVIEW OF LABS AND IMAGING:   
 
 
Labs:  
 
Labs: Results:  
Chemistry Recent Labs  
  02/26/19 
0016 02/25/19 
1740 * 126*  136  
K 4.7 4.1  103 CO2 26 25 BUN 15 15 CREA 1.03 1.13  
CA 8.6 9.1 AGAP 5 8 BUCR 15 13 AP 85  --   
TP 7.5  --   
ALB 3.5  --   
GLOB 4.0  --   
AGRAT 0.9  --   
  
CBC w/Diff Recent Labs  
  02/26/19 0016 02/25/19 1740 WBC 6.6 9.1  
RBC 5.15 5.57* HGB 15.6 16.4* HCT 44.9 48.5*  248 GRANS  --  76* LYMPH  --  11* EOS  --  2 Cultures Recent Labs  
  02/25/19 1835 02/25/19 1830 CULT NO GROWTH AFTER 12 HOURS NO GROWTH AFTER 12 HOURS All Micro Results Procedure Component Value Units Date/Time CULTURE, BLOOD [[id number]] Collected:  02/25/19 1835 Order Status:  Completed Specimen:  Blood Updated:  02/26/19 0741 Special Requests: NO SPECIAL REQUESTS Culture result: NO GROWTH AFTER 12 HOURS     
 CULTURE, BLOOD [[id number]] Collected:  02/25/19 1830 Order Status:  Completed Specimen:  Blood Updated:  02/26/19 0741 Special Requests: NO SPECIAL REQUESTS Culture result: NO GROWTH AFTER 12 HOURS     
 CULTURE, URINE [[id number]] Collected:  02/25/19 1728 Order Status:  Completed Specimen:  Clean catch Updated:  02/25/19 1836 Urinalysis Color Date Value Ref Range Status 02/25/2019 DARK YELLOW   Final  
 
Appearance Date Value Ref Range Status 02/25/2019 CLEAR   Final  
 
Specific gravity Date Value Ref Range Status 02/25/2019 1.011 1.005 - 1.030   Final  
 
pH (UA) Date Value Ref Range Status 02/25/2019 5.5 5.0 - 8.0   Final  
 
Protein Date Value Ref Range Status 02/25/2019 NEGATIVE  NEG mg/dL Final  
 
Ketone Date Value Ref Range Status 02/25/2019 NEGATIVE  NEG mg/dL Final  
 
Bilirubin Date Value Ref Range Status 02/25/2019 NEGATIVE  NEG   Final  
 
Blood Date Value Ref Range Status 02/25/2019 NEGATIVE  NEG   Final  
 
Urobilinogen Date Value Ref Range Status 02/25/2019 0.2 0.2 - 1.0 EU/dL Final  
 
Nitrites Date Value Ref Range Status 02/25/2019 NEGATIVE  NEG   Final  
 
Leukocyte Esterase Date Value Ref Range Status 02/25/2019 MODERATE (A) NEG   Final  
 
Potassium Date Value Ref Range Status 02/26/2019 4.7 3.5 - 5.5 mmol/L Final  
 
Creatinine Date Value Ref Range Status 02/26/2019 1.03 0.6 - 1.3 MG/DL Final  
 
BUN Date Value Ref Range Status 02/26/2019 15 7.0 - 18 MG/DL Final  
  
PSA No results for input(s): PSA in the last 72 hours. Coagulation Lab Results Component Value Date/Time  
 aPTT 28.6 02/26/2019 12:16 AM

## 2019-02-26 NOTE — ED NOTES
Pt sent to OR, sent with levaquin dose, lactic completed, belongings with family, Dr Scar Mora saw patient.

## 2019-02-26 NOTE — PROGRESS NOTES
Discharge: 
 
Patient will discharge home today (2/26/2019). Patient has no home health orders in place at the time of discharge. Patient's father will transport home at the time of discharge. There are no other care manager concerns regarding this discharge. This writer will continue to closely monitor for discharge planning to ensure a safe discharge home from [facility]. Lemmie M. Tressia Klinefelter. [facility] Care Manager Pager#: [phone]

## 2019-02-26 NOTE — DISCHARGE SUMMARY
[facility]  [facility]       Patient: Garima Rocha Age: 57 y.o. : 1970 MR#: [id number]: [id number]  PCP on record: Brain Monique MD  Admit date: 2019  Discharge date: 2019    Disposition:    []Home   []Home with Home Health   []SNF/NH   []Rehab   [x]Home with family   []Alternate Facility:____________________    Admission Diagnoses:  Orchitis and epididymitis [N45.3]  UTI (urinary tract infection) [N39.0]    Discharge Diagnoses:                             1. UTI  2. Orchitis with R testicular swelling    3. Urinary tract obstruction with meatal stenosis  4. H/o HTN    Discharge Medications:     Current Discharge Medication List      START taking these medications    Details   oxyCODONE-acetaminophen (PERCOCET) 5-325 mg per tablet Take 1 Tab by mouth every six (6) hours as needed for up to 3 days. Max Daily Amount: 4 Tabs. Qty: 10 Tab, Refills: 0    Associated Diagnoses: Orchitis and epididymitis; Congenital urinary meatus stricture      doxycycline (MONODOX) 100 mg capsule Take 1 Cap by mouth two (2) times a day for 14 days. Qty: 28 Cap, Refills: 0         CONTINUE these medications which have NOT CHANGED    Details   metoprolol succinate (TOPROL XL) 100 mg XL tablet Take  by mouth daily. amLODIPine (NORVASC) 10 mg tablet Take 10 mg by mouth daily. Consults:    - urology     Procedures:  Urology procedures on 19 as follows:   1) Meatotomy   2) Cystourethroscopy  3) Sahni catheter placement     Significant Diagnostic Studies:   -  US SCROTUM/TESTICLES on 2019  IMPRESSION:    Markedly heterogeneous left testicle with diminished vascularity compared to  the contralateral testicle but some slight arterial and venous waveforms are  identified in the left testicle. Sequela of prior infarction or  intermittent/partial torsion are possible.  The left epididymis is also  heterogeneous in appearance.    Right epididymal head cyst measuring up to 8 mm.    Bilateral hydroceles, right greater than left. Hospital Course by Problem   1. UTI - treated with IV antibiotics and IVF during admission with provision of doxycycline x 14 day course upon discharge. 2.  Orchitis with R testicular swelling  - per U/S and continued with antibiotics   3. Urinary tract obstruction with meatal stenosis - underwent meatotomy, cystourethroscopy showing no additional strictures but with certain cystitis and francisco catheter placement. Planned voiding trial as outpatient in 3 days per urology with close follow up thereafter (urology coordinating). Patient reports being very comfortable with francisco care (states has had x 2 in the past) and defers HHC. 4. H/o HTN -  BP running low earlier in admission likely in setting of acute infection and then ? Urology procedure.   Blood pressure improved at time of discharge, fine to resume pre-admission medications on  (discussed with patient)    Today's examination of the patient revealed:     Subjective:   Feels well, no abdominal pain or pain with urination, only discomfort if francisco is \"tugged\"; denies SOB, dizziness, n/v  Objective:   VS:   Visit Vitals  /71   Pulse 77   Temp 97.7 °F (36.5 °C)   Resp 16   Ht 6' (1.829 m)   Wt 89.8 kg (198 lb)   SpO2 95%   BMI 26.85 kg/m²      Tmax/24hrs: Temp (24hrs), Av °F (36.7 °C), Min:97.1 °F (36.2 °C), Max:98.8 °F (37.1 °C)     Input/Output:     Intake/Output Summary (Last 24 hours) at 2019 1119  Last data filed at 2019 0418  Gross per 24 hour   Intake 1750 ml   Output 1250 ml   Net 500 ml     General:  Alert, NAD  Cardiovascular:  RRR   Pulmonary:  LSC throughout  GI:  +BS in all four quadrants, soft, non-tender  : francisco catheter in place; sutures to penis intact; + circumcised  Extremities:  No edema; 2+ dorsalis pedis pulses bilaterally  Neuro: alert & oriented x 3    Labs:    Recent Results (from the past 24 hour(s))   URINALYSIS W/ RFLX MICROSCOPIC    Collection Time: 02/25/19  5:28 PM   Result Value Ref Range    Color DARK YELLOW      Appearance CLEAR      Specific gravity 1.011 1.005 - 1.030      pH (UA) 5.5 5.0 - 8.0      Protein NEGATIVE  NEG mg/dL    Glucose NEGATIVE  NEG mg/dL    Ketone NEGATIVE  NEG mg/dL    Bilirubin NEGATIVE  NEG      Blood NEGATIVE  NEG      Urobilinogen 0.2 0.2 - 1.0 EU/dL    Nitrites NEGATIVE  NEG      Leukocyte Esterase MODERATE (A) NEG     CULTURE, URINE    Collection Time: 02/25/19  5:28 PM   Result Value Ref Range    Special Requests: NO SPECIAL REQUESTS      Culture result: NO GROWTH AFTER 15 HOURS     URINE MICROSCOPIC ONLY    Collection Time: 02/25/19  5:28 PM   Result Value Ref Range    WBC 20 to 35 0 - 4 /hpf    RBC NEGATIVE  0 - 5 /hpf    Epithelial cells FEW 0 - 5 /lpf    Bacteria FEW (A) NEG /hpf   METABOLIC PANEL, BASIC    Collection Time: 02/25/19  5:40 PM   Result Value Ref Range    Sodium 136 136 - 145 mmol/L    Potassium 4.1 3.5 - 5.5 mmol/L    Chloride 103 100 - 108 mmol/L    CO2 25 21 - 32 mmol/L    Anion gap 8 3.0 - 18 mmol/L    Glucose 126 (H) 74 - 99 mg/dL    BUN 15 7.0 - 18 MG/DL    Creatinine 1.13 0.6 - 1.3 MG/DL    BUN/Creatinine ratio 13 12 - 20      GFR est AA >60 >60 ml/min/1.73m2    GFR est non-AA >60 >60 ml/min/1.73m2    Calcium 9.1 8.5 - 10.1 MG/DL   CBC WITH AUTOMATED DIFF    Collection Time: 02/25/19  5:40 PM   Result Value Ref Range    WBC 9.1 4.6 - 13.2 K/uL    RBC 5.57 (H) 4.70 - 5.50 M/uL    HGB 16.4 (H) 13.0 - 16.0 g/dL    HCT 48.5 (H) 36.0 - 48.0 %    MCV 87.1 74.0 - 97.0 FL    MCH 29.4 24.0 - 34.0 PG    MCHC 33.8 31.0 - 37.0 g/dL    RDW 12.5 11.6 - 14.5 %    PLATELET 398 609 - 215 K/uL    MPV 9.5 9.2 - 11.8 FL    NEUTROPHILS 76 (H) 40 - 73 %    LYMPHOCYTES 11 (L) 21 - 52 %    MONOCYTES 11 (H) 3 - 10 %    EOSINOPHILS 2 0 - 5 %    BASOPHILS 0 0 - 2 %    ABS. NEUTROPHILS 6.9 1.8 - 8.0 K/UL    ABS. LYMPHOCYTES 1.0 0.9 - 3.6 K/UL    ABS. MONOCYTES 1.0 0.05 - 1.2 K/UL    ABS. EOSINOPHILS 0.2 0.0 - 0.4 K/UL    ABS. BASOPHILS 0.0 0.0 - 0.1 K/UL    DF AUTOMATED     CULTURE, BLOOD    Collection Time: 02/25/19  6:30 PM   Result Value Ref Range    Special Requests: NO SPECIAL REQUESTS      Culture result: NO GROWTH AFTER 12 HOURS     CULTURE, BLOOD    Collection Time: 02/25/19  6:35 PM   Result Value Ref Range    Special Requests: NO SPECIAL REQUESTS      Culture result: NO GROWTH AFTER 12 HOURS     POC LACTIC ACID    Collection Time: 02/25/19  6:57 PM   Result Value Ref Range    Lactic Acid (POC) 1.53 0.40 - 8.43 mmol/L   METABOLIC PANEL, COMPREHENSIVE    Collection Time: 02/26/19 12:16 AM   Result Value Ref Range    Sodium 137 136 - 145 mmol/L    Potassium 4.7 3.5 - 5.5 mmol/L    Chloride 106 100 - 108 mmol/L    CO2 26 21 - 32 mmol/L    Anion gap 5 3.0 - 18 mmol/L    Glucose 130 (H) 74 - 99 mg/dL    BUN 15 7.0 - 18 MG/DL    Creatinine 1.03 0.6 - 1.3 MG/DL    BUN/Creatinine ratio 15 12 - 20      GFR est AA >60 >60 ml/min/1.73m2    GFR est non-AA >60 >60 ml/min/1.73m2    Calcium 8.6 8.5 - 10.1 MG/DL    Bilirubin, total 0.8 0.2 - 1.0 MG/DL    ALT (SGPT) 40 16 - 61 U/L    AST (SGOT) 20 15 - 37 U/L    Alk.  phosphatase 85 45 - 117 U/L    Protein, total 7.5 6.4 - 8.2 g/dL    Albumin 3.5 3.4 - 5.0 g/dL    Globulin 4.0 2.0 - 4.0 g/dL    A-G Ratio 0.9 0.8 - 1.7     LIPID PANEL    Collection Time: 02/26/19 12:16 AM   Result Value Ref Range    LIPID PROFILE          Cholesterol, total 212 (H) <200 MG/DL    Triglyceride 115 <150 MG/DL    HDL Cholesterol 62 (H) 40 - 60 MG/DL    LDL, calculated 127 (H) 0 - 100 MG/DL    VLDL, calculated 23 MG/DL    CHOL/HDL Ratio 3.4 0 - 5.0     CBC W/O DIFF    Collection Time: 02/26/19 12:16 AM   Result Value Ref Range    WBC 6.6 4.6 - 13.2 K/uL    RBC 5.15 4.70 - 5.50 M/uL    HGB 15.6 13.0 - 16.0 g/dL    HCT 44.9 36.0 - 48.0 %    MCV 87.2 74.0 - 97.0 FL    MCH 30.3 24.0 - 34.0 PG    MCHC 34.7 31.0 - 37.0 g/dL    RDW 12.5 11.6 - 14.5 %    PLATELET 361 083 - 826 K/uL    MPV 9.5 9.2 - 11.8 FL   PTT    Collection Time: 02/26/19 12:16 AM   Result Value Ref Range    aPTT 28.6 23.0 - 36.4 SEC     Additional Data Reviewed:     Condition: Stable  Follow-up Appointments:   Dr. Pal House in 5-7 days Urology in 6 weeks    >30 minutes spent coordinating this discharge (review instructions/follow-up, prescriptions, preparing report for sign off)    Signed:  Kym Ji NP  2/26/2019  11:19 AM

## 2019-02-26 NOTE — ROUTINE PROCESS
Pt given discharge instructions. Pt verified that each discharge page matched his name. IV d/cd no signs of infection noted. Pt d/cd to home. Pt stable.

## 2019-02-26 NOTE — OP NOTES
Operative Note  Patient: Carmencita Valle               Sex: male             MRN: [id number]  YOB: 1970      Age:  57 y.o. Preoperative Diagnosis: Meatal stenosis   Postoperative Diagnosis:  Same  Surgeon: Lex Horan     Indication: This is a 58 y/o man with meatal stenosis s/p meatoplasty in 2004, failure and subsequent dilation in 2014 who presented to ED with Epididimo-orchitis. PVR in ED was 600cc and urines positive for infection. He would not tolerate bedside manipulation. He is here for meatotomy, urethral dilation, cystoscopy and catheter placement. Procedure:    1) Meatotomy   2) Cystourethroscopy  3) Sahni catheter placement     Findings:    1). Pinpoint meatal stenosis   2). Meatotomy performed, urethra everted to level of incision with interrupted vycril   3). Urethroscopy negative for additional strictures  4). Bladder with obvious cystitis     Narrative of Events: The patient was brought to the operative suite. Anesthesia was induced and preoperative antibiotics were administered. They were then placed in the dorsal lithotomy position and their external genitalia was prepped and draped in the usual fashion. A surgical timeout was performed confirming the patient's name, date of birth, laterality, and antibiotics. All were in agreement. A wire was advanced into the urethra and confirmed in the bladder fluoroscopically. A cocker was then placed to clamp the ventral 6 oclock position of a the scarred urethral meatus and this crushed area was then incised. A 22 Setswana cystourethroscope was then inserted into the patients bladder. The urethra revealed no abnormalities other than the very distal most portion involving the meatus. Fossa navicularis was otherwise normal. . Prostate was small and non obstructing. Thorough cystoscopy was performed with both the 30 degree and 70 degree lens which revealed pancystitis and erythema.       Scope was removed and a 16fr francisco catheter was easily placed into the bladder. Urethra edges were re approximated with interrupted 3-0 vicryl to the newly cut ventral penile skin in attempt to lauren healthy mucosa. Estimated Blood Loss: <5CC     Anesthesia:  General           Drains: 16 fr francisco            Complications:  None           Counts: Sponge and needle counts were correct times two. Plan:  1. Overnight observation   2. Francisco out in 3 days  3.  Will need follow up in 6 weeks with Recon, likely will need more definitive repair     Harper Berry MD  2/25/2019

## 2019-02-26 NOTE — PROGRESS NOTES
Patient seen and evaluated - ultimately feeling fine no complaints unless francisco catheter is \"tugged\". Feels comfortable with francisco care as previously with francisco x 2 and defers HHC. Await urology input post surgery in anticipation of likely d/c later today- doxy x 14 days ordered by urology. Signed By: Esteban Veloz NP February 26, 2019

## 2019-02-26 NOTE — PROGRESS NOTES
Patient is not available to be assessed at this time. [address] [facility] Cert[facility] [employer] [facility]  
[phone]

## 2019-02-26 NOTE — PROGRESS NOTES
Reason for Admission:  Orchitis and epididymitis [N45.3] UTI (urinary tract infection) [N39.0] RRAT Score:   3 Plan for utilizing home health:   Patient has no home health orders in place at this time. This writer will continue to closely monitor for potential home health needs and orders. Likelihood of Readmission:   LOW Transition of Care Plan:    Patient will return home with help from his family. Patient's family will transport home at the time of discharge. Initial assessment completed withpatient. Cognitive status of patient: Alert and oriented. Face sheet information confirmed:  yes. The patient designates his mother Tiana Baker to participate in his/her discharge plan and to receive any needed information. This patient lives in a house with his mother Tiana Neff), father (Renetta Nathan) and his brother and sister. Patient is able to navigate steps as needed. Prior to hospitalization, patient was considered to be independent with ADLs/IADLS : yes . Patient has a current ACP document on file: no  
 
Patient's mother or father will be available to transport patient home upon discharge. The patient has no medical equipment available in the home. Patient is not currently active with home health. Patient has not stayed in a skilled nursing facility or rehab. Currently, the discharge plan is to return home with help from his family. Patient's mother or father will be the one who transports patient at the time of discharge. Patient's mother is Jimbo Martinez, NI#[phone]). Patient's PCP is Dr. José Luis Montero. Patient is insured through [employer]. The patient states that he can obtain his medications from the pharmacy, and take his medications as directed. This writer will continue to closely monitor for discharge planning to ensure a safe discharge home from [facility]. Care Management Interventions PCP Verified by CM: Yes(Dr. Meliton Garcia) Palliative Care Criteria Met (RRAT>21 & CHF Dx)?: No 
Mode of Transport at Discharge: Other (see comment)(Family will transport.) Transition of Care Consult (CM Consult): Discharge Planning Current Support Network: [place], Lives with Caregiver, Family Lives [place] Confirm Follow Up Transport: Family Plan discussed with Pt/Family/Caregiver: Yes Discharge Location Discharge Placement: Home with family assistance [place]. MSW Care Manager Pager#: [phone]

## 2019-02-26 NOTE — ANESTHESIA PREPROCEDURE EVALUATION
Anesthetic History No history of anesthetic complications Review of Systems / Medical History Patient summary reviewed and pertinent labs reviewed Pulmonary Within defined limits Neuro/Psych Within defined limits Cardiovascular Hypertension: well controlled Exercise tolerance: >4 METS 
  
GI/Hepatic/Renal 
  
 
 
Renal disease Endo/Other Within defined limits Other Findings Physical Exam 
 
Airway Mallampati: II 
TM Distance: 4 - 6 cm Neck ROM: normal range of motion Mouth opening: Normal 
 
 Cardiovascular Rhythm: regular Rate: normal 
 
 
 
 Dental 
No notable dental hx Pulmonary Breath sounds clear to auscultation Abdominal 
GI exam deferred Other Findings Anesthetic Plan ASA: 2, emergent Anesthesia type: general 
 
 
 
 
Induction: Intravenous Anesthetic plan and risks discussed with: Patient

## 2019-02-26 NOTE — PROGRESS NOTES
Problem: Falls - Risk of 
Goal: *Absence of Falls Document Carina Marinelli Fall Risk and appropriate interventions in the flowsheet. Outcome: Resolved/Met Date Met: 02/26/19 Fall Risk Interventions: 
  
 
  
 
Medication Interventions: Patient to call before getting OOB, Teach patient to arise slowly

## 2019-02-26 NOTE — DISCHARGE INSTRUCTIONS
DISCHARGE SUMMARY from Nurse    PATIENT INSTRUCTIONS:    After general anesthesia or intravenous sedation, for 24 hours or while taking prescription Narcotics:  · Limit your activities  · Do not drive and operate hazardous machinery  · Do not make important personal or business decisions  · Do  not drink alcoholic beverages  · If you have not urinated within 8 hours after discharge, please contact your surgeon on call. Report the following to your surgeon:  · Excessive pain, swelling, redness or odor of or around the surgical area  · Temperature over 100.5  · Nausea and vomiting lasting longer than 4 hours or if unable to take medications  · Any signs of decreased circulation or nerve impairment to extremity: change in color, persistent  numbness, tingling, coldness or increase pain  · Any questions    What to do at Home:  Recommended activity: Activity as tolerated,     If you experience any of the following symptoms fever, chills, shortness of breath, please follow up with  md.    *  Please give a list of your current medications to your Primary Care Provider. *  Please update this list whenever your medications are discontinued, doses are      changed, or new medications (including over-the-counter products) are added. *  Please carry medication information at all times in case of emergency situations. These are general instructions for a healthy lifestyle:    No smoking/ No tobacco products/ Avoid exposure to second hand smoke  Surgeon General's Warning:  Quitting smoking now greatly reduces serious risk to your health.     Obesity, smoking, and sedentary lifestyle greatly increases your risk for illness    A healthy diet, regular physical exercise & weight monitoring are important for maintaining a healthy lifestyle    You may be retaining fluid if you have a history of heart failure or if you experience any of the following symptoms:  Weight gain of 3 pounds or more overnight or 5 pounds in a week, increased swelling in our hands or feet or shortness of breath while lying flat in bed. Please call your doctor as soon as you notice any of these symptoms; do not wait until your next office visit. Recognize signs and symptoms of STROKE:    F-face looks uneven    A-arms unable to move or move unevenly    S-speech slurred or non-existent    T-time-call 911 as soon as signs and symptoms begin-DO NOT go       Back to bed or wait to see if you get better-TIME IS BRAIN. Warning Signs of HEART ATTACK     Call 911 if you have these symptoms:   Chest discomfort. Most heart attacks involve discomfort in the center of the chest that lasts more than a few minutes, or that goes away and comes back. It can feel like uncomfortable pressure, squeezing, fullness, or pain.  Discomfort in other areas of the upper body. Symptoms can include pain or discomfort in one or both arms, the back, neck, jaw, or stomach.  Shortness of breath with or without chest discomfort.  Other signs may include breaking out in a cold sweat, nausea, or lightheadedness. Don't wait more than five minutes to call 911 - MINUTES MATTER! Fast action can save your life. Calling 911 is almost always the fastest way to get lifesaving treatment. Emergency Medical Services staff can begin treatment when they arrive -- up to an hour sooner than if someone gets to the hospital by car. The discharge information has been reviewed with the patient. The patient verbalized understanding. Discharge medications reviewed with the patient and appropriate educational materials and side effects teaching were provided. ___________________________________________________________________________________________________________________________________Discharge Instructions    Patient: Keyona Tinoco MRN: [id number]  CSN: [id number]    YOB: 1970  Age: 57 y.o.   Sex: male    DOA: 2/25/2019 LOS:  LOS: 1 day   Discharge Date:      DIET: Cardiac Diet    ACTIVITY: Activity as tolerated    Maintain Sahni x 3 days. Per urology will make arrangements for [facility] as outpatient    ADDITIONAL INFORMATION: If you experience any of the following symptoms but not limited to Fever, chills, nausea, vomiting, diarrhea, change in mentation, falling, bleeding, shortness of breath, chest pain, please call your primary care physician or return to the emergency room if you cannot get hold of your doctor:     FOLLOW UP CARE:  Dr. Rosi Das MD in 5-7 days  Urology in 6 weeks    Lux Bolton NP  2/26/2019 11:16 AM    Epididymitis and Orchitis: Care Instructions  Your Care Instructions    Epididymitis is pain and swelling of the tube that is attached to each testicle. This tube is called the epididymis. Orchitis is pain and swelling of the testicle. Infection with bacteria often causes these conditions. Sexually transmitted infections (STIs) also can cause both conditions. This is often the case in men younger than 28. Other causes in boys and older men are infections from surgery or having a catheter that drains urine. The mumps virus also can cause orchitis. Anti-inflammatory or pain medicines can help with the pain. Antibiotics are used if the problem is caused by bacteria. They are not used if a virus is the cause. Your testicle may stay swollen for many days or even a few weeks. The doctor has checked you carefully, but problems can develop later. If you notice any problems or new symptoms, get medical treatment right away. Follow-up care is a key part of your treatment and safety. Be sure to make and go to all appointments, and call your doctor if you are having problems. It's also a good idea to know your test results and keep a list of the medicines you take. How can you care for yourself at home? · If your doctor prescribed antibiotics, take them as directed. Do not stop taking them just because you feel better.  You need to take the full course of antibiotics. · Ask your doctor if you can take an over-the-counter pain medicine, such as acetaminophen (Tylenol), ibuprofen (Advil, Motrin), or naproxen (Aleve). Be safe with medicines. Read and follow all instructions on the label. · Limit your activity to what is comfortable. · Wear snug underwear or an athletic supporter. This can help reduce pain. · Apply either cold or heat to the swollen area. Use the one that works best for your pain. Sitting in a warm bath for 15 minutes twice a day will help reduce the swelling more quickly. · If you have been told that an STI may have caused your condition, do not have sex until your doctor says it is safe. This will prevent spreading the infection. Tell your sex partner or partners that they need to be checked. They may need treatment. When should you call for help? Call your doctor now or seek immediate medical care if:    · Your pain gets worse.     · You have a new or higher fever.     · You have new or more swelling of your testicle.     · You have new belly pain, or your pain gets worse.    Watch closely for changes in your health, and be sure to contact your doctor if:    · You do not get better as expected. Where can you learn more? Go to http://anahy-gallo.info/. Enter S360 in the search box to learn more about \"Epididymitis and Orchitis: Care Instructions. \"  Current as of: March 20, 2018  Content Version: 11.9  © 5921-7711 [employer]. Care instructions adapted under license by [employer] (which disclaims liability or warranty for this information). If you have questions about a medical condition or this instruction, always ask your healthcare professional. [place] [address] any warranty or liability for your use of this information. Learning About Urethral Stricture in Men  What is a urethral stricture? A urethral stricture is a narrowing of the urethra.  The urethra is the tube that carries urine from your bladder to outside your body. When something makes the urethra get tighter, it's hard for urine to pass out of your body. Strictures are more common in men than in women. What causes it? Doctors sometimes don't know what causes a stricture. Often it's caused by an injury to the area around a man's scrotum. An injury can be from a fall, a procedure, or an infection, such as a sexually transmitted infection. What are the symptoms? Your urine may come out very slowly or in a weak stream. You may have belly pain from urine building up in your bladder. It may hurt to pass urine. You may not be able to control the leaking of urine. You may also have blood in your urine and urinary tract infections. How is it diagnosed? Your doctor will ask you questions about your medical history. He or she will do a physical exam.  Your doctor may check to see how fast urine flows through your urethra. He or she may measure how much urine is held in your bladder. You may also have an ultrasound or an X-ray test that uses dye. These tests can let your doctor see how the urine moves through your urethra. After your doctor knows where the problem is, he or she can suggest treatment. How is it treated? Your doctor may recommend urethral dilation. This procedure widens the urethra. A thin tube called a catheter may be left in the urethra for a few days. It helps to keep the urethra open while it heals. In some cases, the doctor also may do a procedure called a urethrotomy. The doctor uses a thin, lighted tool called a cystoscope. The scope has a special blade at the tip to cut through scar tissue. That can help widen the urethra. If dilation can't be done or doesn't help, you may need surgery to remove the scar tissue. At the same time, your doctor can rebuild the urethra. This is called urethroplasty. Follow-up care is a key part of your treatment and safety.  Be sure to make and go to all appointments, and call your doctor if you are having problems. It's also a good idea to know your test results and keep a list of the medicines you take. Where can you learn more? Go to http://anahy-gallo.info/. Enter O441 in the search box to learn more about \"Learning About Urethral Stricture in Men.\"  Current as of: March 20, 2018  Content Version: 11.9  © 3295-6189 [employer]. Care instructions adapted under license by [employer] (which disclaims liability or warranty for this information). If you have questions about a medical condition or this instruction, always ask your healthcare professional. [address] any warranty or liability for your use of this information. Patient Education        Epididymitis and Orchitis: Care Instructions  Your Care Instructions    Epididymitis is pain and swelling of the tube that is attached to each testicle. This tube is called the epididymis. Orchitis is pain and swelling of the testicle. Infection with bacteria often causes these conditions. Sexually transmitted infections (STIs) also can cause both conditions. This is often the case in men younger than [address] [facility]. Other causes in boys and older men are infections from surgery or having a catheter that drains urine. The mumps virus also can cause orchitis. Anti-inflammatory or pain medicines can help with the pain. Antibiotics are used if the problem is caused by bacteria. They are not used if a virus is the cause. Your testicle may stay swollen for many days or even a few weeks. The doctor has checked you carefully, but problems can develop later. If you notice any problems or new symptoms, get medical treatment right away. Follow-up care is a key part of your treatment and safety. Be sure to make and go to all appointments, and call your doctor if you are having problems.  It's also a good idea to know your test results and keep a list of the medicines you take. How can you care for yourself at home? · If your doctor prescribed antibiotics, take them as directed. Do not stop taking them just because you feel better. You need to take the full course of antibiotics. · Ask your doctor if you can take an over-the-counter pain medicine, such as acetaminophen (Tylenol), ibuprofen (Advil, Motrin), or naproxen (Aleve). Be safe with medicines. Read and follow all instructions on the label. · Limit your activity to what is comfortable. · Wear snug underwear or an athletic supporter. This can help reduce pain. · Apply either cold or heat to the swollen area. Use the one that works best for your pain. Sitting in a warm bath for 15 minutes twice a day will help reduce the swelling more quickly. · If you have been told that an STI may have caused your condition, do not have sex until your doctor says it is safe. This will prevent spreading the infection. Tell your sex partner or partners that they need to be checked. They may need treatment. When should you call for help? Call your doctor now or seek immediate medical care if:    · Your pain gets worse.     · You have a new or higher fever.     · You have new or more swelling of your testicle.     · You have new belly pain, or your pain gets worse.    Watch closely for changes in your health, and be sure to contact your doctor if:    · You do not get better as expected. Where can you learn more? Go to http://anahy-gallo.info/. Enter S360 in the search box to learn more about \"Epididymitis and Orchitis: Care Instructions. \"  Current as of: March 20, 2018  Content Version: 11.9  © 8102-3671 [employer]. Care instructions adapted under license by [employer] (which disclaims liability or warranty for this information).  If you have questions about a medical condition or this instruction, always ask your healthcare professional. Moira Leroy disclaims any warranty or liability for your use of this information.

## 2019-02-26 NOTE — ROUTINE PROCESS
Bedside and Verbal shift change report given to Yoon Villar RN (oncoming nurse) by Janneth Lynn RN (offgoing nurse). Report included the following information SBAR, Kardex, MAR and Recent Results. SITUATION:  
? Code Status: Full Code 
? Reason for Admission: Orchitis and epididymitis [N45.3] ? UTI (urinary tract infection) [N39.0] ? Hospital day: 1 ? Problem List:  
   
Hospital Problems  Date Reviewed: 9/29/2014 Codes Class Noted POA Orchitis and epididymitis ICD-10-CM: N45.3 ICD-9-CM: 604.90  2/25/2019 Unknown UTI (urinary tract infection) ICD-10-CM: N39.0 ICD-9-CM: 599.0  2/25/2019 Unknown BACKGROUND:  
 Past Medical History:  
Past Medical History:  
Diagnosis Date  Hypertension Patient taking anticoagulants no ASSESSMENT:  
? Changes in Assessment Throughout Shift: post-op ? Patient has Central Line: no Reasons if yes:  
? Patient has Sahni Cath: yes Reasons if yes: surg ? Last Vitals: 
  
Vitals:  
 02/25/19 2100 02/25/19 2122 02/25/19 2348 02/26/19 0345 BP: 104/63 126/85 114/69 110/68 Pulse: 73 79 85 74 Resp: 14 16 16 16 Temp:  97.9 °F (36.6 °C) 98.5 °F (36.9 °C) 98.8 °F (37.1 °C) SpO2: 95% 97% 98% 96% Weight:      
Height:      
 
 
? IV and DRAINS (will only show if present) Peripheral IV 02/25/19 Right;Upper Arm-Site Assessment: Clean, dry, & intact ? WOUND (if present) Wound Type:  none Dressing present Dressing Present : No 
 Wound Concerns/Notes:  none ? PAIN Pain Assessment Pain Intensity 1: 0 (02/26/19 0345) Patient Stated Pain Goal: 0 
o Interventions for Pain:  none 
o Intervention effective:  
o Time of last intervention:   
o Reassessment Completed:   
 
? Last 3 Weights: 
Last 3 Recorded Weights in this Encounter 02/25/19 1236 Weight: 89.8 kg (198 lb) Weight change: ? INTAKE/OUPUT Current Shift: 02/25 1901 - 02/26 0700 In: 3209 [P.O.:960; I.V.:790] Out: 1250 [JJMKY:1224] Last three shifts: No intake/output data recorded. ? LAB RESULTS Recent Labs  
  02/26/19 
0016 02/25/19 
1740 WBC 6.6 9.1 HGB 15.6 16.4* HCT 44.9 48.5*  248 Recent Labs  
  02/26/19 
0016 02/25/19 
1740  136  
K 4.7 4.1 * 126* BUN 15 15 CREA 1.03 1.13  
CA 8.6 9.1 RECOMMENDATIONS AND DISCHARGE PLANNING 1. Pending tests/procedures/ Plan of Care or Other Needs: none 2. Discharge plan for patient and Needs/Barriers: home with francisco 3. Estimated Discharge Date: today Posted on Whiteboard in [address] [place] [address] Room: yes 4. The patient's care plan was reviewed with the oncoming nurse. \"HEALS\" SAFETY CHECK Fall Risk Total Score: 1 Safety Measures: Safety Measures: Bed/Chair-Wheels locked, Bed in low position, Call light within reach, Gripper socks, Side rails X 3 A safety check occurred in the patient's room between off going nurse and oncoming nurse listed above. The safety check included the below items Area Items H High Alert Medications ? Verify all high alert medication drips (heparin, PCA, etc.) E Equipment ? Suction is set up for ALL patients (with matthieu) ? Red plugs utilized for all equipment (IV pumps, etc.) ? WOWs wiped down at end of shift. ? Room stocked with oxygen, suction, and other unit-specific supplies A Alarms ? Bed alarm is set for fall risk patients ? Ensure chair alarm is in place and activated if patient is up in a chair L Lines ? Check IV for any infiltration ? Francisco bag is empty if patient has a Francisco ? Tubing and IV bags are labeled Carren Factor Safety ? Room is clean, patient is clean, and equipment is clean. ? Hallways are clear from equipment besides carts. ? Fall bracelet on for fall risk patients ? Ensure room is clear and free of clutter ? Suction is set up for ALL patients (with matthieu) ? Hallways are clear from equipment besides carts. ? Isolation precautions followed, supplies available outside room, sign posted Morgan Serna RN

## 2019-02-27 LAB
BACTERIA SPEC CULT: NORMAL
SERVICE CMNT-IMP: NORMAL

## 2019-03-03 LAB
BACTERIA SPEC CULT: NORMAL
BACTERIA SPEC CULT: NORMAL
SERVICE CMNT-IMP: NORMAL
SERVICE CMNT-IMP: NORMAL

## 2020-02-18 NOTE — ANESTHESIA POSTPROCEDURE EVALUATION
Procedure(s): 
CYSTOSCOPY URETHRAL DILATATION/POSS SUPRAPUBIC TUBE/C-ARM. Anesthesia Post Evaluation Multimodal analgesia: multimodal analgesia used between 6 hours prior to anesthesia start to PACU discharge Patient location during evaluation: bedside Patient participation: complete - patient participated Level of consciousness: awake Pain management: adequate Airway patency: patent Anesthetic complications: no 
Cardiovascular status: stable Respiratory status: acceptable Hydration status: acceptable Post anesthesia nausea and vomiting:  controlled Visit Vitals /63 Pulse 73 Temp 36.7 °C (98.1 °F) Resp 14 Ht 6' (1.829 m) Wt 89.8 kg (198 lb) SpO2 95% BMI 26.85 kg/m²
Yes

## 2022-03-19 PROBLEM — N39.0 UTI (URINARY TRACT INFECTION): Status: ACTIVE | Noted: 2019-02-25

## 2022-03-20 PROBLEM — N45.3 ORCHITIS AND EPIDIDYMITIS: Status: ACTIVE | Noted: 2019-02-25

## 2025-06-05 ENCOUNTER — HOSPITAL ENCOUNTER (EMERGENCY)
Facility: HOSPITAL | Age: 55
Discharge: HOME | DRG: 690 | End: 2025-06-08

## 2025-06-05 ENCOUNTER — HOSPITAL ENCOUNTER (INPATIENT)
Facility: HOSPITAL | Age: 55
LOS: 3 days | Discharge: HOME | DRG: 690 | End: 2025-06-09
Attending: EMERGENCY MEDICINE | Admitting: INTERNAL MEDICINE

## 2025-06-05 ENCOUNTER — APPOINTMENT (OUTPATIENT)
Facility: HOSPITAL | Age: 55
DRG: 690 | End: 2025-06-05

## 2025-06-05 DIAGNOSIS — N45.3 ORCHITIS AND EPIDIDYMITIS: ICD-10-CM

## 2025-06-05 DIAGNOSIS — A41.9 SEVERE SEPSIS (HCC): Primary | ICD-10-CM

## 2025-06-05 DIAGNOSIS — R65.20 SEVERE SEPSIS (HCC): Primary | ICD-10-CM

## 2025-06-05 DIAGNOSIS — N30.01 ACUTE CYSTITIS WITH HEMATURIA: ICD-10-CM

## 2025-06-05 DIAGNOSIS — N45.1 EPIDIDYMITIS: ICD-10-CM

## 2025-06-05 LAB
ALBUMIN SERPL-MCNC: 3.8 G/DL (ref 3.4–5)
ALBUMIN/GLOB SERPL: 1 (ref 0.8–1.7)
ALP SERPL-CCNC: 100 U/L (ref 45–117)
ALT SERPL-CCNC: 121 U/L (ref 10–50)
AMORPH CRY URNS QL MICRO: ABNORMAL
ANION GAP SERPL CALC-SCNC: 16 MMOL/L (ref 3–18)
APPEARANCE UR: ABNORMAL
AST SERPL-CCNC: 72 U/L (ref 10–38)
BACTERIA URNS QL MICRO: ABNORMAL /HPF
BASOPHILS # BLD: 0.05 K/UL (ref 0–0.1)
BASOPHILS NFR BLD: 0.3 % (ref 0–2)
BILIRUB SERPL-MCNC: 1.6 MG/DL (ref 0.2–1)
BILIRUB UR QL: NEGATIVE
BUN SERPL-MCNC: 12 MG/DL (ref 6–23)
BUN/CREAT SERPL: 12 (ref 12–20)
CALCIUM SERPL-MCNC: 10.2 MG/DL (ref 8.5–10.1)
CHLORIDE SERPL-SCNC: 96 MMOL/L (ref 98–107)
CO2 SERPL-SCNC: 22 MMOL/L (ref 21–32)
COLOR UR: YELLOW
CREAT SERPL-MCNC: 0.97 MG/DL (ref 0.6–1.3)
DIFFERENTIAL METHOD BLD: ABNORMAL
EOSINOPHIL # BLD: 0.04 K/UL (ref 0–0.4)
EOSINOPHIL NFR BLD: 0.3 % (ref 0–5)
EPITH CASTS URNS QL MICRO: ABNORMAL /LPF (ref 0–5)
ERYTHROCYTE [DISTWIDTH] IN BLOOD BY AUTOMATED COUNT: 12.5 % (ref 11.6–14.5)
GLOBULIN SER CALC-MCNC: 3.7 G/DL (ref 2–4)
GLUCOSE SERPL-MCNC: 180 MG/DL (ref 74–108)
GLUCOSE UR STRIP.AUTO-MCNC: NEGATIVE MG/DL
HCT VFR BLD AUTO: 45.1 % (ref 36–48)
HGB BLD-MCNC: 15.5 G/DL (ref 13–16)
HGB UR QL STRIP: ABNORMAL
IMM GRANULOCYTES # BLD AUTO: 0.17 K/UL (ref 0–0.04)
IMM GRANULOCYTES NFR BLD AUTO: 1.2 % (ref 0–0.5)
KETONES UR QL STRIP.AUTO: ABNORMAL MG/DL
LACTATE BLD-SCNC: 2.31 MMOL/L (ref 0.4–2)
LEUKOCYTE ESTERASE UR QL STRIP.AUTO: ABNORMAL
LIPASE SERPL-CCNC: 37 U/L (ref 13–75)
LYMPHOCYTES # BLD: 0.61 K/UL (ref 0.9–3.6)
LYMPHOCYTES NFR BLD: 4.2 % (ref 21–52)
MCH RBC QN AUTO: 30.6 PG (ref 24–34)
MCHC RBC AUTO-ENTMCNC: 34.4 G/DL (ref 31–37)
MCV RBC AUTO: 89 FL (ref 78–100)
MONOCYTES # BLD: 0.53 K/UL (ref 0.05–1.2)
MONOCYTES NFR BLD: 3.6 % (ref 3–10)
NEUTS SEG # BLD: 13.27 K/UL (ref 1.8–8)
NEUTS SEG NFR BLD: 90.4 % (ref 40–73)
NITRITE UR QL STRIP.AUTO: NEGATIVE
NRBC # BLD: 0 K/UL (ref 0–0.01)
NRBC BLD-RTO: 0 PER 100 WBC
PH UR STRIP: >8.5 [PH] (ref 5–8)
PLATELET # BLD AUTO: 229 K/UL (ref 135–420)
PMV BLD AUTO: 9.5 FL (ref 9.2–11.8)
POTASSIUM SERPL-SCNC: 4.3 MMOL/L (ref 3.5–5.5)
PROCALCITONIN SERPL-MCNC: 0.23 NG/ML
PROT SERPL-MCNC: 7.5 G/DL (ref 6.4–8.2)
PROT UR STRIP-MCNC: 100 MG/DL
RBC # BLD AUTO: 5.07 M/UL (ref 4.35–5.65)
RBC #/AREA URNS HPF: ABNORMAL /HPF (ref 0–5)
SODIUM SERPL-SCNC: 134 MMOL/L (ref 136–145)
SP GR UR REFRACTOMETRY: 1.01 (ref 1–1.03)
TRI-PHOS CRY URNS QL MICRO: ABNORMAL
UROBILINOGEN UR QL STRIP.AUTO: 1 EU/DL (ref 0.2–1)
WBC # BLD AUTO: 14.7 K/UL (ref 4.6–13.2)
WBC URNS QL MICRO: ABNORMAL /HPF (ref 0–4)

## 2025-06-05 PROCEDURE — 83605 ASSAY OF LACTIC ACID: CPT

## 2025-06-05 PROCEDURE — 85025 COMPLETE CBC W/AUTO DIFF WBC: CPT

## 2025-06-05 PROCEDURE — 74177 CT ABD & PELVIS W/CONTRAST: CPT

## 2025-06-05 PROCEDURE — 87040 BLOOD CULTURE FOR BACTERIA: CPT

## 2025-06-05 PROCEDURE — 6360000002 HC RX W HCPCS: Performed by: STUDENT IN AN ORGANIZED HEALTH CARE EDUCATION/TRAINING PROGRAM

## 2025-06-05 PROCEDURE — 93005 ELECTROCARDIOGRAM TRACING: CPT

## 2025-06-05 PROCEDURE — 83690 ASSAY OF LIPASE: CPT

## 2025-06-05 PROCEDURE — 87491 CHLMYD TRACH DNA AMP PROBE: CPT

## 2025-06-05 PROCEDURE — 80053 COMPREHEN METABOLIC PANEL: CPT

## 2025-06-05 PROCEDURE — 84145 PROCALCITONIN (PCT): CPT

## 2025-06-05 PROCEDURE — 96365 THER/PROPH/DIAG IV INF INIT: CPT

## 2025-06-05 PROCEDURE — 2580000003 HC RX 258: Performed by: STUDENT IN AN ORGANIZED HEALTH CARE EDUCATION/TRAINING PROGRAM

## 2025-06-05 PROCEDURE — 2580000003 HC RX 258

## 2025-06-05 PROCEDURE — 99285 EMERGENCY DEPT VISIT HI MDM: CPT

## 2025-06-05 PROCEDURE — 76870 US EXAM SCROTUM: CPT

## 2025-06-05 PROCEDURE — 87591 N.GONORRHOEAE DNA AMP PROB: CPT

## 2025-06-05 PROCEDURE — 6360000004 HC RX CONTRAST MEDICATION: Performed by: STUDENT IN AN ORGANIZED HEALTH CARE EDUCATION/TRAINING PROGRAM

## 2025-06-05 PROCEDURE — 81001 URINALYSIS AUTO W/SCOPE: CPT

## 2025-06-05 RX ORDER — SODIUM CHLORIDE, SODIUM LACTATE, POTASSIUM CHLORIDE, AND CALCIUM CHLORIDE .6; .31; .03; .02 G/100ML; G/100ML; G/100ML; G/100ML
30 INJECTION, SOLUTION INTRAVENOUS ONCE
Status: COMPLETED | OUTPATIENT
Start: 2025-06-05 | End: 2025-06-06

## 2025-06-05 RX ORDER — VANCOMYCIN 2 G/400ML
2000 INJECTION, SOLUTION INTRAVENOUS ONCE
Status: COMPLETED | OUTPATIENT
Start: 2025-06-05 | End: 2025-06-06

## 2025-06-05 RX ORDER — IOPAMIDOL 612 MG/ML
90 INJECTION, SOLUTION INTRAVASCULAR
Status: COMPLETED | OUTPATIENT
Start: 2025-06-05 | End: 2025-06-05

## 2025-06-05 RX ADMIN — PIPERACILLIN AND TAZOBACTAM 4500 MG: 4; .5 INJECTION, POWDER, FOR SOLUTION INTRAVENOUS at 22:53

## 2025-06-05 RX ADMIN — SODIUM CHLORIDE, SODIUM LACTATE, POTASSIUM CHLORIDE, AND CALCIUM CHLORIDE 1000 ML: .6; .31; .03; .02 INJECTION, SOLUTION INTRAVENOUS at 22:54

## 2025-06-05 RX ADMIN — IOPAMIDOL 90 ML: 612 INJECTION, SOLUTION INTRAVENOUS at 23:52

## 2025-06-05 ASSESSMENT — PAIN DESCRIPTION - DESCRIPTORS: DESCRIPTORS: ACHING

## 2025-06-05 ASSESSMENT — LIFESTYLE VARIABLES
HOW OFTEN DO YOU HAVE A DRINK CONTAINING ALCOHOL: 2-4 TIMES A MONTH
HOW MANY STANDARD DRINKS CONTAINING ALCOHOL DO YOU HAVE ON A TYPICAL DAY: 3 OR 4

## 2025-06-05 ASSESSMENT — PAIN DESCRIPTION - FREQUENCY: FREQUENCY: CONTINUOUS

## 2025-06-05 ASSESSMENT — PAIN - FUNCTIONAL ASSESSMENT
PAIN_FUNCTIONAL_ASSESSMENT: ACTIVITIES ARE NOT PREVENTED
PAIN_FUNCTIONAL_ASSESSMENT: 0-10

## 2025-06-05 ASSESSMENT — PAIN DESCRIPTION - LOCATION: LOCATION: FLANK

## 2025-06-05 ASSESSMENT — PAIN DESCRIPTION - ORIENTATION: ORIENTATION: RIGHT

## 2025-06-05 ASSESSMENT — PAIN SCALES - GENERAL: PAINLEVEL_OUTOF10: 9

## 2025-06-05 ASSESSMENT — PAIN DESCRIPTION - PAIN TYPE: TYPE: ACUTE PAIN

## 2025-06-06 ENCOUNTER — APPOINTMENT (OUTPATIENT)
Facility: HOSPITAL | Age: 55
DRG: 690 | End: 2025-06-06

## 2025-06-06 PROBLEM — N10 ACUTE PYELONEPHRITIS: Status: ACTIVE | Noted: 2025-06-06

## 2025-06-06 LAB
C TRACH RRNA SPEC QL NAA+PROBE: NEGATIVE
CA-I SERPL-SCNC: 1.13 MMOL/L (ref 1.15–1.33)
EKG ATRIAL RATE: 119 BPM
EKG DIAGNOSIS: NORMAL
EKG P AXIS: 47 DEGREES
EKG P-R INTERVAL: 134 MS
EKG Q-T INTERVAL: 304 MS
EKG QRS DURATION: 70 MS
EKG QTC CALCULATION (BAZETT): 427 MS
EKG R AXIS: -6 DEGREES
EKG T AXIS: 46 DEGREES
EKG VENTRICULAR RATE: 119 BPM
EST. AVERAGE GLUCOSE BLD GHB EST-MCNC: 154 MG/DL
HBA1C MFR BLD: 7 % (ref 4.2–5.6)
LACTATE BLD-SCNC: 2.42 MMOL/L (ref 0.4–2)
LACTATE BLD-SCNC: 2.66 MMOL/L (ref 0.4–2)
LACTATE BLD-SCNC: 2.85 MMOL/L (ref 0.4–2)
N GONORRHOEA RRNA SPEC QL NAA+PROBE: NEGATIVE
SPECIMEN SOURCE: NORMAL

## 2025-06-06 PROCEDURE — 6360000002 HC RX W HCPCS: Performed by: INTERNAL MEDICINE

## 2025-06-06 PROCEDURE — 6360000002 HC RX W HCPCS: Performed by: STUDENT IN AN ORGANIZED HEALTH CARE EDUCATION/TRAINING PROGRAM

## 2025-06-06 PROCEDURE — 1100000003 HC PRIVATE W/ TELEMETRY

## 2025-06-06 PROCEDURE — 2580000003 HC RX 258: Performed by: INTERNAL MEDICINE

## 2025-06-06 PROCEDURE — 6370000000 HC RX 637 (ALT 250 FOR IP): Performed by: INTERNAL MEDICINE

## 2025-06-06 PROCEDURE — 93010 ELECTROCARDIOGRAM REPORT: CPT | Performed by: INTERNAL MEDICINE

## 2025-06-06 PROCEDURE — 83605 ASSAY OF LACTIC ACID: CPT

## 2025-06-06 PROCEDURE — 36415 COLL VENOUS BLD VENIPUNCTURE: CPT

## 2025-06-06 PROCEDURE — 94761 N-INVAS EAR/PLS OXIMETRY MLT: CPT

## 2025-06-06 PROCEDURE — 6370000000 HC RX 637 (ALT 250 FOR IP)

## 2025-06-06 PROCEDURE — 99223 1ST HOSP IP/OBS HIGH 75: CPT | Performed by: INTERNAL MEDICINE

## 2025-06-06 PROCEDURE — 82330 ASSAY OF CALCIUM: CPT

## 2025-06-06 PROCEDURE — 96375 TX/PRO/DX INJ NEW DRUG ADDON: CPT

## 2025-06-06 PROCEDURE — 2500000003 HC RX 250 WO HCPCS: Performed by: INTERNAL MEDICINE

## 2025-06-06 PROCEDURE — 83036 HEMOGLOBIN GLYCOSYLATED A1C: CPT

## 2025-06-06 PROCEDURE — 87086 URINE CULTURE/COLONY COUNT: CPT

## 2025-06-06 RX ORDER — ACETAMINOPHEN 650 MG/1
650 SUPPOSITORY RECTAL EVERY 6 HOURS PRN
Status: DISCONTINUED | OUTPATIENT
Start: 2025-06-06 | End: 2025-06-09 | Stop reason: HOSPADM

## 2025-06-06 RX ORDER — POTASSIUM CHLORIDE 7.45 MG/ML
10 INJECTION INTRAVENOUS PRN
Status: DISCONTINUED | OUTPATIENT
Start: 2025-06-06 | End: 2025-06-09 | Stop reason: HOSPADM

## 2025-06-06 RX ORDER — TRAMADOL HYDROCHLORIDE 50 MG/1
50 TABLET ORAL EVERY 4 HOURS PRN
Status: DISCONTINUED | OUTPATIENT
Start: 2025-06-06 | End: 2025-06-09 | Stop reason: HOSPADM

## 2025-06-06 RX ORDER — DIPHENHYDRAMINE HYDROCHLORIDE 50 MG/ML
25 INJECTION, SOLUTION INTRAMUSCULAR; INTRAVENOUS EVERY 6 HOURS PRN
Status: DISCONTINUED | OUTPATIENT
Start: 2025-06-06 | End: 2025-06-09 | Stop reason: HOSPADM

## 2025-06-06 RX ORDER — MAGNESIUM SULFATE IN WATER 40 MG/ML
2000 INJECTION, SOLUTION INTRAVENOUS PRN
Status: DISCONTINUED | OUTPATIENT
Start: 2025-06-06 | End: 2025-06-09 | Stop reason: HOSPADM

## 2025-06-06 RX ORDER — VANCOMYCIN 1.75 G/350ML
1250 INJECTION, SOLUTION INTRAVENOUS EVERY 12 HOURS
Status: DISCONTINUED | OUTPATIENT
Start: 2025-06-06 | End: 2025-06-06

## 2025-06-06 RX ORDER — SODIUM CHLORIDE 9 MG/ML
INJECTION, SOLUTION INTRAVENOUS CONTINUOUS
Status: DISPENSED | OUTPATIENT
Start: 2025-06-06 | End: 2025-06-07

## 2025-06-06 RX ORDER — ENOXAPARIN SODIUM 100 MG/ML
30 INJECTION SUBCUTANEOUS 2 TIMES DAILY
Status: DISCONTINUED | OUTPATIENT
Start: 2025-06-06 | End: 2025-06-09 | Stop reason: HOSPADM

## 2025-06-06 RX ORDER — MORPHINE SULFATE 2 MG/ML
2 INJECTION, SOLUTION INTRAMUSCULAR; INTRAVENOUS
Refills: 0 | Status: DISCONTINUED | OUTPATIENT
Start: 2025-06-06 | End: 2025-06-09 | Stop reason: HOSPADM

## 2025-06-06 RX ORDER — BISACODYL 10 MG
10 SUPPOSITORY, RECTAL RECTAL DAILY PRN
Status: DISCONTINUED | OUTPATIENT
Start: 2025-06-06 | End: 2025-06-09 | Stop reason: HOSPADM

## 2025-06-06 RX ORDER — ACETAMINOPHEN 325 MG/1
650 TABLET ORAL
Status: COMPLETED | OUTPATIENT
Start: 2025-06-06 | End: 2025-06-06

## 2025-06-06 RX ORDER — SODIUM CHLORIDE 0.9 % (FLUSH) 0.9 %
5-40 SYRINGE (ML) INJECTION PRN
Status: DISCONTINUED | OUTPATIENT
Start: 2025-06-06 | End: 2025-06-09 | Stop reason: HOSPADM

## 2025-06-06 RX ORDER — POTASSIUM CHLORIDE 1500 MG/1
40 TABLET, EXTENDED RELEASE ORAL PRN
Status: DISCONTINUED | OUTPATIENT
Start: 2025-06-06 | End: 2025-06-09 | Stop reason: HOSPADM

## 2025-06-06 RX ORDER — ONDANSETRON 4 MG/1
4 TABLET, ORALLY DISINTEGRATING ORAL EVERY 8 HOURS PRN
Status: DISCONTINUED | OUTPATIENT
Start: 2025-06-06 | End: 2025-06-09 | Stop reason: HOSPADM

## 2025-06-06 RX ORDER — SODIUM CHLORIDE 0.9 % (FLUSH) 0.9 %
5-40 SYRINGE (ML) INJECTION EVERY 12 HOURS SCHEDULED
Status: DISCONTINUED | OUTPATIENT
Start: 2025-06-06 | End: 2025-06-09 | Stop reason: HOSPADM

## 2025-06-06 RX ORDER — OXYCODONE HYDROCHLORIDE 5 MG/1
5 TABLET ORAL EVERY 4 HOURS PRN
Refills: 0 | Status: DISCONTINUED | OUTPATIENT
Start: 2025-06-06 | End: 2025-06-09 | Stop reason: HOSPADM

## 2025-06-06 RX ORDER — SODIUM CHLORIDE 9 MG/ML
INJECTION, SOLUTION INTRAVENOUS PRN
Status: DISCONTINUED | OUTPATIENT
Start: 2025-06-06 | End: 2025-06-09 | Stop reason: HOSPADM

## 2025-06-06 RX ORDER — ONDANSETRON 2 MG/ML
4 INJECTION INTRAMUSCULAR; INTRAVENOUS EVERY 6 HOURS PRN
Status: DISCONTINUED | OUTPATIENT
Start: 2025-06-06 | End: 2025-06-09 | Stop reason: HOSPADM

## 2025-06-06 RX ORDER — LACTOBACILLUS RHAMNOSUS GG 10B CELL
1 CAPSULE ORAL
Status: DISCONTINUED | OUTPATIENT
Start: 2025-06-06 | End: 2025-06-09 | Stop reason: HOSPADM

## 2025-06-06 RX ORDER — ACETAMINOPHEN 325 MG/1
650 TABLET ORAL EVERY 6 HOURS PRN
Status: DISCONTINUED | OUTPATIENT
Start: 2025-06-06 | End: 2025-06-09 | Stop reason: HOSPADM

## 2025-06-06 RX ORDER — DIAZEPAM 10 MG/2ML
5 INJECTION, SOLUTION INTRAMUSCULAR; INTRAVENOUS EVERY 12 HOURS PRN
Status: DISCONTINUED | OUTPATIENT
Start: 2025-06-06 | End: 2025-06-09 | Stop reason: HOSPADM

## 2025-06-06 RX ADMIN — PIPERACILLIN AND TAZOBACTAM 3375 MG: 3; .375 INJECTION, POWDER, LYOPHILIZED, FOR SOLUTION INTRAVENOUS at 20:34

## 2025-06-06 RX ADMIN — ENOXAPARIN SODIUM 30 MG: 100 INJECTION SUBCUTANEOUS at 20:34

## 2025-06-06 RX ADMIN — SODIUM CHLORIDE, PRESERVATIVE FREE 10 ML: 5 INJECTION INTRAVENOUS at 08:47

## 2025-06-06 RX ADMIN — ENOXAPARIN SODIUM 30 MG: 100 INJECTION SUBCUTANEOUS at 02:42

## 2025-06-06 RX ADMIN — ACETAMINOPHEN 650 MG: 325 TABLET ORAL at 12:08

## 2025-06-06 RX ADMIN — ENOXAPARIN SODIUM 30 MG: 100 INJECTION SUBCUTANEOUS at 08:46

## 2025-06-06 RX ADMIN — VANCOMYCIN HYDROCHLORIDE 1250 MG: 10 INJECTION, POWDER, LYOPHILIZED, FOR SOLUTION INTRAVENOUS at 12:17

## 2025-06-06 RX ADMIN — Medication 1 CAPSULE: at 08:46

## 2025-06-06 RX ADMIN — SODIUM CHLORIDE: 0.9 INJECTION, SOLUTION INTRAVENOUS at 02:46

## 2025-06-06 RX ADMIN — ACETAMINOPHEN 650 MG: 325 TABLET ORAL at 01:09

## 2025-06-06 RX ADMIN — VANCOMYCIN 2000 MG: 2 INJECTION, SOLUTION INTRAVENOUS at 00:35

## 2025-06-06 RX ADMIN — VANCOMYCIN HYDROCHLORIDE 500 MG: 500 INJECTION, POWDER, LYOPHILIZED, FOR SOLUTION INTRAVENOUS at 04:13

## 2025-06-06 RX ADMIN — PIPERACILLIN AND TAZOBACTAM 3375 MG: 3; .375 INJECTION, POWDER, LYOPHILIZED, FOR SOLUTION INTRAVENOUS at 12:21

## 2025-06-06 ASSESSMENT — PAIN SCALES - GENERAL
PAINLEVEL_OUTOF10: 3
PAINLEVEL_OUTOF10: 7
PAINLEVEL_OUTOF10: 0

## 2025-06-06 ASSESSMENT — PAIN DESCRIPTION - LOCATION: LOCATION: GROIN

## 2025-06-06 NOTE — ED NOTES
SBAR Report given to receiving nurse. No further requests at this time. Pt is ready for transport.

## 2025-06-06 NOTE — ED NOTES
Apt re hooked up to monitor. Fluids resumed. Pt appeared diaphoretic. This nurse took his temp. Vitals validated. MD aware. Pt will be medicated.

## 2025-06-06 NOTE — ED NOTES
SBAR report given to Komal. A repeat Lactic will be performed before I bring pt up. This nurse will transport pt upon completion.

## 2025-06-06 NOTE — CONSULTS
[facility]  (A Division of [facility])      Consultation Note      Date of Admission: 6/5/2025    Date of Note: 6/6/2025      Reason for Referral: sepsis, UTI  Referring Physician: Dr. Sy Marmolejo Reviewed:   6/5 blood culture: No growth to date x 2  6/5 GC/chlamydia JOSE: Negative    Current Antimicrobials:    Prior Antimicrobials:          Assessment:         SIRS: With tachycardia, leukocytosis and temp of 100.3.  Most likely related to right sided epididymitis and UTI  Lactic acidosis  Right epididymitis: GC chlamydia are negative.  Most likely these represents gram-negative's from enteric pathogens.  Complicated urinary tract infection: He does have a history of an enterococcal UTI in 2014.    Plan:   Check urine culture-I do not see that one has been sent since presentation.  UA is consistent with UTI.    Follow-up blood cultures x 2 sets    D/C vancomycin  Ideally I would transition him to oral levofloxacin 500 however he expressed concern regarding his intolerance to ciprofloxacin from a prior admission which mostly consisted of nausea and vomiting.  He would like to remain on Zosyn for now and will reconsider transition as more data becomes available.    Discussed with Dr. Sy Gonzalez DO  [facility]  [address], [address], [place] [address]  Office: [phone], Ext [phone]    HPI:  Mr. Bocanegra is a 54-year-old gentleman without much history outside of a prior hospitalization about 10 years ago for urinary tract infection who is now presenting to the ED with complaints of right sided scrotal pain.  He says that the pain mostly started 2 days ago.  Initially it was tolerable but then he started to have increased swelling and tenderness and he was not able to tolerate any pain even when lying down.  This is what prompted him to come to the emergency department for further evaluation.  He feels as though

## 2025-06-06 NOTE — ED PROVIDER NOTES
14.5 %    Platelets 229 135 - 420 K/uL    MPV 9.5 9.2 - 11.8 FL    Nucleated RBCs 0.0 0  WBC    nRBC 0.00 0.00 - 0.01 K/uL    Neutrophils % 90.4 (H) 40.0 - 73.0 %    Lymphocytes % 4.2 (L) 21.0 - 52.0 %    Monocytes % 3.6 3.0 - 10.0 %    Eosinophils % 0.3 0.0 - 5.0 %    Basophils % 0.3 0.0 - 2.0 %    Immature Granulocytes % 1.2 (H) 0.0 - 0.5 %    Neutrophils Absolute 13.27 (H) 1.80 - 8.00 K/UL    Lymphocytes Absolute 0.61 (L) 0.90 - 3.60 K/UL    Monocytes Absolute 0.53 0.05 - 1.20 K/UL    Eosinophils Absolute 0.04 0.00 - 0.40 K/UL    Basophils Absolute 0.05 0.00 - 0.10 K/UL    Immature Granulocytes Absolute 0.17 (H) 0.00 - 0.04 K/UL    Differential Type AUTOMATED     Comprehensive Metabolic Panel    Collection Time: 06/05/25  9:06 PM   Result Value Ref Range    Sodium 134 (L) 136 - 145 mmol/L    Potassium 4.3 3.5 - 5.5 mmol/L    Chloride 96 (L) 98 - 107 mmol/L    CO2 22 21 - 32 mmol/L    Anion Gap 16 3.0 - 18.0 mmol/L    Glucose 180 (H) 74 - 108 mg/dL    BUN 12 6 - 23 MG/DL    Creatinine 0.97 0.6 - 1.3 MG/DL    BUN/Creatinine Ratio 12 12 - 20      Est, Glom Filt Rate >90 >60 ml/min/1.73m2    Calcium 10.2 (H) 8.5 - 10.1 MG/DL    Total Bilirubin 1.6 (H) 0.2 - 1.0 MG/DL     (H) 10 - 50 U/L    AST 72 (H) 10 - 38 U/L    Alk Phosphatase 100 45 - 117 U/L    Total Protein 7.5 6.4 - 8.2 g/dL    Albumin 3.8 3.4 - 5.0 g/dL    Globulin 3.7 2.0 - 4.0 g/dL    Albumin/Globulin Ratio 1.0 0.8 - 1.7     Lipase    Collection Time: 06/05/25  9:06 PM   Result Value Ref Range    Lipase 37 13 - 75 U/L   Procalcitonin    Collection Time: 06/05/25  9:06 PM   Result Value Ref Range    Procalcitonin 0.23 ng/mL   POCT lactic acid (lactate)    Collection Time: 06/05/25 10:23 PM   Result Value Ref Range    POC Lactic Acid 2.31 (HH) 0.40 - 2.00 mmol/L   POCT lactic acid (lactate)    Collection Time: 06/06/25 12:49 AM   Result Value Ref Range    POC Lactic Acid 2.42 (HH) 0.40 - 2.00 mmol/L   POCT lactic acid (lactate)    Collection

## 2025-06-06 NOTE — ED NOTES
Pt hit call bell. When this nurse arrived. Pt had incontinent episode of feces on the floor. Pt was standing up and stated when using the urinal he couldn't control it coming out.     Pt was given items to clean oneself up in the restroom. Pt bed cleaned, room cleaned. Pt to be transferred after completion.    This nurse still has been unable to leave report.

## 2025-06-06 NOTE — ED TRIAGE NOTES
Pt arrived via Triage ambulatory was seen at [facility] and told to come to the ED. Pt c/o right flank pain for two days that radiates down into his right testicle which he states is swollen.

## 2025-06-06 NOTE — H&P
pharmacy consulted to dose  Will have preferred   to start on Cipro or Levaquinfor better prostate penetration.      However patient has allergy reported to fluoroquinolones.  Zosyn has been ordered and stated    Consult infectious disease for recommendations  We will consider consult to urologist as well for patient to follow after disposition from the hospital.    Lab testing for gonorrhea and Chlamydia sent and pending    Follow culture results as well as sensitivity report closely  De-escalate antibiotic therapy as soon as indicated    Additional management per clinical course    Signed By: Deon Washington DO     June 6, 2025

## 2025-06-07 LAB
BACTERIA SPEC CULT: NORMAL
BASOPHILS # BLD: 0.05 K/UL (ref 0–0.1)
BASOPHILS NFR BLD: 0.3 % (ref 0–2)
DIFFERENTIAL METHOD BLD: ABNORMAL
EOSINOPHIL # BLD: 0.2 K/UL (ref 0–0.4)
EOSINOPHIL NFR BLD: 1.3 % (ref 0–5)
ERYTHROCYTE [DISTWIDTH] IN BLOOD BY AUTOMATED COUNT: 12.7 % (ref 11.6–14.5)
HCT VFR BLD AUTO: 40 % (ref 36–48)
HGB BLD-MCNC: 13.7 G/DL (ref 13–16)
IMM GRANULOCYTES # BLD AUTO: 0.25 K/UL (ref 0–0.04)
IMM GRANULOCYTES NFR BLD AUTO: 1.7 % (ref 0–0.5)
LACTATE SERPL-SCNC: 1 MMOL/L (ref 0.4–2)
LYMPHOCYTES # BLD: 0.97 K/UL (ref 0.9–3.6)
LYMPHOCYTES NFR BLD: 6.5 % (ref 21–52)
MCH RBC QN AUTO: 30.8 PG (ref 24–34)
MCHC RBC AUTO-ENTMCNC: 34.3 G/DL (ref 31–37)
MCV RBC AUTO: 89.9 FL (ref 78–100)
MONOCYTES # BLD: 0.73 K/UL (ref 0.05–1.2)
MONOCYTES NFR BLD: 4.9 % (ref 3–10)
NEUTS SEG # BLD: 12.81 K/UL (ref 1.8–8)
NEUTS SEG NFR BLD: 85.3 % (ref 40–73)
NRBC # BLD: 0 K/UL (ref 0–0.01)
NRBC BLD-RTO: 0 PER 100 WBC
PLATELET # BLD AUTO: 188 K/UL (ref 135–420)
PMV BLD AUTO: 9.9 FL (ref 9.2–11.8)
RBC # BLD AUTO: 4.45 M/UL (ref 4.35–5.65)
SERVICE CMNT-IMP: NORMAL
WBC # BLD AUTO: 15 K/UL (ref 4.6–13.2)

## 2025-06-07 PROCEDURE — 6360000002 HC RX W HCPCS: Performed by: INTERNAL MEDICINE

## 2025-06-07 PROCEDURE — 36415 COLL VENOUS BLD VENIPUNCTURE: CPT

## 2025-06-07 PROCEDURE — 85025 COMPLETE CBC W/AUTO DIFF WBC: CPT

## 2025-06-07 PROCEDURE — 99232 SBSQ HOSP IP/OBS MODERATE 35: CPT | Performed by: FAMILY MEDICINE

## 2025-06-07 PROCEDURE — 2500000003 HC RX 250 WO HCPCS: Performed by: INTERNAL MEDICINE

## 2025-06-07 PROCEDURE — 83605 ASSAY OF LACTIC ACID: CPT

## 2025-06-07 PROCEDURE — 2580000003 HC RX 258: Performed by: INTERNAL MEDICINE

## 2025-06-07 PROCEDURE — 1100000003 HC PRIVATE W/ TELEMETRY

## 2025-06-07 PROCEDURE — 6370000000 HC RX 637 (ALT 250 FOR IP): Performed by: INTERNAL MEDICINE

## 2025-06-07 RX ORDER — METOPROLOL SUCCINATE 100 MG/1
100 TABLET, EXTENDED RELEASE ORAL DAILY
COMMUNITY

## 2025-06-07 RX ORDER — AMLODIPINE BESYLATE 10 MG/1
10 TABLET ORAL DAILY
COMMUNITY

## 2025-06-07 RX ADMIN — Medication 1 CAPSULE: at 08:04

## 2025-06-07 RX ADMIN — PIPERACILLIN AND TAZOBACTAM 3375 MG: 3; .375 INJECTION, POWDER, LYOPHILIZED, FOR SOLUTION INTRAVENOUS at 05:18

## 2025-06-07 RX ADMIN — PIPERACILLIN AND TAZOBACTAM 3375 MG: 3; .375 INJECTION, POWDER, LYOPHILIZED, FOR SOLUTION INTRAVENOUS at 12:45

## 2025-06-07 RX ADMIN — ENOXAPARIN SODIUM 30 MG: 100 INJECTION SUBCUTANEOUS at 20:18

## 2025-06-07 RX ADMIN — PIPERACILLIN AND TAZOBACTAM 3375 MG: 3; .375 INJECTION, POWDER, LYOPHILIZED, FOR SOLUTION INTRAVENOUS at 20:21

## 2025-06-07 RX ADMIN — SODIUM CHLORIDE, PRESERVATIVE FREE 10 ML: 5 INJECTION INTRAVENOUS at 08:05

## 2025-06-07 RX ADMIN — SODIUM CHLORIDE, PRESERVATIVE FREE 10 ML: 5 INJECTION INTRAVENOUS at 20:18

## 2025-06-07 RX ADMIN — ENOXAPARIN SODIUM 30 MG: 100 INJECTION SUBCUTANEOUS at 08:04

## 2025-06-07 ASSESSMENT — PAIN SCALES - GENERAL
PAINLEVEL_OUTOF10: 0

## 2025-06-08 ENCOUNTER — APPOINTMENT (OUTPATIENT)
Facility: HOSPITAL | Age: 55
DRG: 690 | End: 2025-06-08

## 2025-06-08 LAB
ANION GAP SERPL CALC-SCNC: 14 MMOL/L (ref 3–18)
BASOPHILS # BLD: 0.04 K/UL (ref 0–0.1)
BASOPHILS NFR BLD: 0.3 % (ref 0–2)
BUN SERPL-MCNC: 16 MG/DL (ref 6–23)
BUN/CREAT SERPL: 17 (ref 12–20)
CALCIUM SERPL-MCNC: 8.8 MG/DL (ref 8.5–10.1)
CHLORIDE SERPL-SCNC: 100 MMOL/L (ref 98–107)
CO2 SERPL-SCNC: 19 MMOL/L (ref 21–32)
CREAT SERPL-MCNC: 0.95 MG/DL (ref 0.6–1.3)
DIFFERENTIAL METHOD BLD: ABNORMAL
EOSINOPHIL # BLD: 0.38 K/UL (ref 0–0.4)
EOSINOPHIL NFR BLD: 2.9 % (ref 0–5)
ERYTHROCYTE [DISTWIDTH] IN BLOOD BY AUTOMATED COUNT: 12.7 % (ref 11.6–14.5)
GLUCOSE SERPL-MCNC: 132 MG/DL (ref 74–108)
HCT VFR BLD AUTO: 40.8 % (ref 36–48)
HGB BLD-MCNC: 13.4 G/DL (ref 13–16)
IMM GRANULOCYTES # BLD AUTO: 0.15 K/UL (ref 0–0.04)
IMM GRANULOCYTES NFR BLD AUTO: 1.2 % (ref 0–0.5)
LYMPHOCYTES # BLD: 1.08 K/UL (ref 0.9–3.6)
LYMPHOCYTES NFR BLD: 8.3 % (ref 21–52)
MCH RBC QN AUTO: 29.5 PG (ref 24–34)
MCHC RBC AUTO-ENTMCNC: 32.8 G/DL (ref 31–37)
MCV RBC AUTO: 89.7 FL (ref 78–100)
MONOCYTES # BLD: 0.8 K/UL (ref 0.05–1.2)
MONOCYTES NFR BLD: 6.2 % (ref 3–10)
NEUTS SEG # BLD: 10.54 K/UL (ref 1.8–8)
NEUTS SEG NFR BLD: 81.1 % (ref 40–73)
NRBC # BLD: 0 K/UL (ref 0–0.01)
NRBC BLD-RTO: 0 PER 100 WBC
PLATELET # BLD AUTO: 197 K/UL (ref 135–420)
PMV BLD AUTO: 10 FL (ref 9.2–11.8)
POTASSIUM SERPL-SCNC: 3.7 MMOL/L (ref 3.5–5.5)
RBC # BLD AUTO: 4.55 M/UL (ref 4.35–5.65)
SODIUM SERPL-SCNC: 134 MMOL/L (ref 136–145)
WBC # BLD AUTO: 13 K/UL (ref 4.6–13.2)

## 2025-06-08 PROCEDURE — 80048 BASIC METABOLIC PNL TOTAL CA: CPT

## 2025-06-08 PROCEDURE — 76870 US EXAM SCROTUM: CPT

## 2025-06-08 PROCEDURE — 36415 COLL VENOUS BLD VENIPUNCTURE: CPT

## 2025-06-08 PROCEDURE — 2580000003 HC RX 258: Performed by: INTERNAL MEDICINE

## 2025-06-08 PROCEDURE — 1100000003 HC PRIVATE W/ TELEMETRY

## 2025-06-08 PROCEDURE — 6370000000 HC RX 637 (ALT 250 FOR IP): Performed by: INTERNAL MEDICINE

## 2025-06-08 PROCEDURE — 94761 N-INVAS EAR/PLS OXIMETRY MLT: CPT

## 2025-06-08 PROCEDURE — 6360000002 HC RX W HCPCS: Performed by: INTERNAL MEDICINE

## 2025-06-08 PROCEDURE — 2500000003 HC RX 250 WO HCPCS: Performed by: INTERNAL MEDICINE

## 2025-06-08 PROCEDURE — 99232 SBSQ HOSP IP/OBS MODERATE 35: CPT | Performed by: FAMILY MEDICINE

## 2025-06-08 PROCEDURE — 85025 COMPLETE CBC W/AUTO DIFF WBC: CPT

## 2025-06-08 RX ADMIN — SODIUM CHLORIDE, PRESERVATIVE FREE 10 ML: 5 INJECTION INTRAVENOUS at 08:13

## 2025-06-08 RX ADMIN — ENOXAPARIN SODIUM 30 MG: 100 INJECTION SUBCUTANEOUS at 20:15

## 2025-06-08 RX ADMIN — PIPERACILLIN AND TAZOBACTAM 3375 MG: 3; .375 INJECTION, POWDER, LYOPHILIZED, FOR SOLUTION INTRAVENOUS at 20:14

## 2025-06-08 RX ADMIN — PIPERACILLIN AND TAZOBACTAM 3375 MG: 3; .375 INJECTION, POWDER, LYOPHILIZED, FOR SOLUTION INTRAVENOUS at 12:34

## 2025-06-08 RX ADMIN — SODIUM CHLORIDE, PRESERVATIVE FREE 10 ML: 5 INJECTION INTRAVENOUS at 20:15

## 2025-06-08 RX ADMIN — PIPERACILLIN AND TAZOBACTAM 3375 MG: 3; .375 INJECTION, POWDER, LYOPHILIZED, FOR SOLUTION INTRAVENOUS at 04:08

## 2025-06-08 RX ADMIN — Medication 1 CAPSULE: at 08:12

## 2025-06-08 RX ADMIN — ENOXAPARIN SODIUM 30 MG: 100 INJECTION SUBCUTANEOUS at 08:13

## 2025-06-08 ASSESSMENT — PAIN SCALES - GENERAL
PAINLEVEL_OUTOF10: 0

## 2025-06-09 VITALS
OXYGEN SATURATION: 96 % | SYSTOLIC BLOOD PRESSURE: 131 MMHG | RESPIRATION RATE: 18 BRPM | TEMPERATURE: 98.1 F | WEIGHT: 235 LBS | BODY MASS INDEX: 31.83 KG/M2 | HEIGHT: 72 IN | HEART RATE: 90 BPM | DIASTOLIC BLOOD PRESSURE: 91 MMHG

## 2025-06-09 LAB
ANION GAP SERPL CALC-SCNC: 13 MMOL/L (ref 3–18)
BASOPHILS # BLD: 0.06 K/UL (ref 0–0.1)
BASOPHILS NFR BLD: 0.6 % (ref 0–2)
BUN SERPL-MCNC: 17 MG/DL (ref 6–23)
BUN/CREAT SERPL: 16 (ref 12–20)
CALCIUM SERPL-MCNC: 8.8 MG/DL (ref 8.5–10.1)
CHLORIDE SERPL-SCNC: 102 MMOL/L (ref 98–107)
CO2 SERPL-SCNC: 19 MMOL/L (ref 21–32)
CREAT SERPL-MCNC: 1.06 MG/DL (ref 0.6–1.3)
DIFFERENTIAL METHOD BLD: ABNORMAL
EOSINOPHIL # BLD: 0.49 K/UL (ref 0–0.4)
EOSINOPHIL NFR BLD: 4.8 % (ref 0–5)
ERYTHROCYTE [DISTWIDTH] IN BLOOD BY AUTOMATED COUNT: 12.3 % (ref 11.6–14.5)
GLUCOSE SERPL-MCNC: 134 MG/DL (ref 74–108)
HCT VFR BLD AUTO: 42.3 % (ref 36–48)
HGB BLD-MCNC: 13.7 G/DL (ref 13–16)
IMM GRANULOCYTES # BLD AUTO: 0.22 K/UL (ref 0–0.04)
IMM GRANULOCYTES NFR BLD AUTO: 2.1 % (ref 0–0.5)
LYMPHOCYTES # BLD: 1.12 K/UL (ref 0.9–3.6)
LYMPHOCYTES NFR BLD: 10.9 % (ref 21–52)
MCH RBC QN AUTO: 29.1 PG (ref 24–34)
MCHC RBC AUTO-ENTMCNC: 32.4 G/DL (ref 31–37)
MCV RBC AUTO: 90 FL (ref 78–100)
MONOCYTES # BLD: 0.83 K/UL (ref 0.05–1.2)
MONOCYTES NFR BLD: 8.1 % (ref 3–10)
NEUTS SEG # BLD: 7.57 K/UL (ref 1.8–8)
NEUTS SEG NFR BLD: 73.5 % (ref 40–73)
NRBC # BLD: 0 K/UL (ref 0–0.01)
NRBC BLD-RTO: 0 PER 100 WBC
PLATELET # BLD AUTO: 221 K/UL (ref 135–420)
PMV BLD AUTO: 9.7 FL (ref 9.2–11.8)
POTASSIUM SERPL-SCNC: 3.9 MMOL/L (ref 3.5–5.5)
RBC # BLD AUTO: 4.7 M/UL (ref 4.35–5.65)
SODIUM SERPL-SCNC: 133 MMOL/L (ref 136–145)
WBC # BLD AUTO: 10.3 K/UL (ref 4.6–13.2)

## 2025-06-09 PROCEDURE — 6370000000 HC RX 637 (ALT 250 FOR IP): Performed by: INTERNAL MEDICINE

## 2025-06-09 PROCEDURE — 94761 N-INVAS EAR/PLS OXIMETRY MLT: CPT

## 2025-06-09 PROCEDURE — 2580000003 HC RX 258: Performed by: INTERNAL MEDICINE

## 2025-06-09 PROCEDURE — 99239 HOSP IP/OBS DSCHRG MGMT >30: CPT | Performed by: FAMILY MEDICINE

## 2025-06-09 PROCEDURE — 85025 COMPLETE CBC W/AUTO DIFF WBC: CPT

## 2025-06-09 PROCEDURE — 2500000003 HC RX 250 WO HCPCS: Performed by: INTERNAL MEDICINE

## 2025-06-09 PROCEDURE — 36415 COLL VENOUS BLD VENIPUNCTURE: CPT

## 2025-06-09 PROCEDURE — 6360000002 HC RX W HCPCS: Performed by: INTERNAL MEDICINE

## 2025-06-09 PROCEDURE — 80048 BASIC METABOLIC PNL TOTAL CA: CPT

## 2025-06-09 RX ORDER — LEVOFLOXACIN 500 MG/1
500 TABLET, FILM COATED ORAL EVERY 24 HOURS
Qty: 5 TABLET | Refills: 0 | Status: SHIPPED | OUTPATIENT
Start: 2025-06-09 | End: 2025-06-14

## 2025-06-09 RX ORDER — OXYCODONE HYDROCHLORIDE 5 MG/1
5 TABLET ORAL EVERY 4 HOURS PRN
Qty: 12 TABLET | Refills: 0 | Status: SHIPPED | OUTPATIENT
Start: 2025-06-09 | End: 2025-06-12

## 2025-06-09 RX ORDER — LEVOFLOXACIN 500 MG/1
500 TABLET, FILM COATED ORAL EVERY 24 HOURS
Status: DISCONTINUED | OUTPATIENT
Start: 2025-06-09 | End: 2025-06-09 | Stop reason: HOSPADM

## 2025-06-09 RX ORDER — LACTOBACILLUS RHAMNOSUS GG 10B CELL
1 CAPSULE ORAL
Qty: 10 CAPSULE | Refills: 0 | Status: SHIPPED | OUTPATIENT
Start: 2025-06-10 | End: 2025-06-20

## 2025-06-09 RX ADMIN — PIPERACILLIN AND TAZOBACTAM 3375 MG: 3; .375 INJECTION, POWDER, LYOPHILIZED, FOR SOLUTION INTRAVENOUS at 12:23

## 2025-06-09 RX ADMIN — ENOXAPARIN SODIUM 30 MG: 100 INJECTION SUBCUTANEOUS at 10:46

## 2025-06-09 RX ADMIN — Medication 1 CAPSULE: at 10:46

## 2025-06-09 RX ADMIN — PIPERACILLIN AND TAZOBACTAM 3375 MG: 3; .375 INJECTION, POWDER, LYOPHILIZED, FOR SOLUTION INTRAVENOUS at 04:25

## 2025-06-09 RX ADMIN — LEVOFLOXACIN 500 MG: 500 TABLET, FILM COATED ORAL at 14:56

## 2025-06-09 RX ADMIN — SODIUM CHLORIDE, PRESERVATIVE FREE 10 ML: 5 INJECTION INTRAVENOUS at 10:46

## 2025-06-09 ASSESSMENT — PAIN SCALES - GENERAL
PAINLEVEL_OUTOF10: 0

## 2025-06-09 NOTE — PLAN OF CARE
Problem: Discharge Planning  Goal: Discharge to home or other facility with appropriate resources  6/9/2025 1317 by Kevin Kaba RN  Outcome: Progressing  Flowsheets (Taken 6/9/2025 0721)  Discharge to home or other facility with appropriate resources:   Identify barriers to discharge with patient and caregiver   Arrange for needed discharge resources and transportation as appropriate   Identify discharge learning needs (meds, wound care, etc)     Problem: Pain  Goal: Verbalizes/displays adequate comfort level or baseline comfort level  6/9/2025 1317 by Kevin Kaba RN  Outcome: Progressing     Problem: Cardiovascular - Adult  Goal: Maintains optimal cardiac output and hemodynamic stability  6/9/2025 1317 by Kevin Kaba RN  Outcome: Progressing  Flowsheets  Taken 6/9/2025 1317  Maintains optimal cardiac output and hemodynamic stability:   Monitor blood pressure and heart rate   Monitor urine output and notify Licensed Independent Practitioner for values outside of normal range   Assess for signs of decreased cardiac output  Taken 6/9/2025 0721  Maintains optimal cardiac output and hemodynamic stability:   Monitor blood pressure and heart rate   Monitor urine output and notify Licensed Independent Practitioner for values outside of normal range   Assess for signs of decreased cardiac output     Problem: Cardiovascular - Adult  Goal: Absence of cardiac dysrhythmias or at baseline  6/9/2025 1317 by Kevin Kaba RN  Outcome: Progressing  Flowsheets  Taken 6/9/2025 1317  Absence of cardiac dysrhythmias or at baseline:   Monitor cardiac rate and rhythm   Assess for signs of decreased cardiac output  Taken 6/9/2025 0721  Absence of cardiac dysrhythmias or at baseline:   Monitor cardiac rate and rhythm   Assess for signs of decreased cardiac output     Problem: Skin/Tissue Integrity - Adult  Goal: Skin integrity remains intact  6/9/2025 1317 by Kevin Kaba, RN  Outcome: 
  Problem: Discharge Planning  Goal: Discharge to home or other facility with appropriate resources  Outcome: Progressing  Flowsheets (Taken 6/6/2025 0754)  Discharge to home or other facility with appropriate resources: Identify barriers to discharge with patient and caregiver     Problem: Pain  Goal: Verbalizes/displays adequate comfort level or baseline comfort level  Outcome: Progressing  Flowsheets (Taken 6/6/2025 1526)  Verbalizes/displays adequate comfort level or baseline comfort level: Encourage patient to monitor pain and request assistance     Problem: Cardiovascular - Adult  Goal: Maintains optimal cardiac output and hemodynamic stability  Outcome: Progressing  Flowsheets (Taken 6/6/2025 1526)  Maintains optimal cardiac output and hemodynamic stability: Monitor blood pressure and heart rate  Goal: Absence of cardiac dysrhythmias or at baseline  Outcome: Progressing  Flowsheets (Taken 6/6/2025 1526)  Absence of cardiac dysrhythmias or at baseline: Monitor cardiac rate and rhythm     Problem: Skin/Tissue Integrity - Adult  Goal: Skin integrity remains intact  Outcome: Progressing  Flowsheets (Taken 6/6/2025 1526)  Skin Integrity Remains Intact: Monitor for areas of redness and/or skin breakdown  Goal: Incisions, wounds, or drain sites healing without S/S of infection  Outcome: Progressing  Flowsheets (Taken 6/6/2025 1526)  Incisions, Wounds, or Drain Sites Healing Without Sign and Symptoms of Infection: TWICE DAILY: Assess and document skin integrity  Goal: Oral mucous membranes remain intact  Outcome: Progressing  Flowsheets (Taken 6/6/2025 1526)  Oral Mucous Membranes Remain Intact: Assess oral mucosa and hygiene practices     Problem: Infection - Adult  Goal: Absence of infection at discharge  Outcome: Progressing  Flowsheets (Taken 6/6/2025 1526)  Absence of infection at discharge:   Assess and monitor for signs and symptoms of infection   Monitor lab/diagnostic results   Monitor all insertion sites 
  Problem: Infection - Adult  Goal: Absence of infection at discharge  6/6/2025 2149 by Sheri Griffin, RN  Flowsheets  Taken 6/6/2025 2149  Absence of infection at discharge:   Assess and monitor for signs and symptoms of infection   Monitor lab/diagnostic results  Taken 6/6/2025 2000  Absence of infection at discharge: Assess and monitor for signs and symptoms of infection  Note: Patient admitted with a UTI.  Will monitor labs and vital signs and administer antibiotics as ordered.      6/6/2025 1526 by Genevieve Hare RN  Outcome: Progressing  Flowsheets (Taken 6/6/2025 1526)  Absence of infection at discharge:   Assess and monitor for signs and symptoms of infection   Monitor lab/diagnostic results   Monitor all insertion sites i.e., indwelling lines, tubes and drains     
  Problem: Pain  Goal: Verbalizes/displays adequate comfort level or baseline comfort level  Outcome: Progressing  Flowsheets (Taken 6/7/2025 1019)  Verbalizes/displays adequate comfort level or baseline comfort level:   Encourage patient to monitor pain and request assistance   Assess pain using appropriate pain scale     Problem: Skin/Tissue Integrity - Adult  Goal: Skin integrity remains intact  Recent Flowsheet Documentation  Taken 6/7/2025 0816 by Brice Ruggiero RN  Skin Integrity Remains Intact: Monitor for areas of redness and/or skin breakdown     Problem: Metabolic/Fluid and Electrolytes - Adult  Goal: Electrolytes maintained within normal limits  Outcome: Progressing  Flowsheets  Taken 6/7/2025 1019  Electrolytes maintained within normal limits: Monitor labs and assess patient for signs and symptoms of electrolyte imbalances  
Signed.     Problem: Discharge Planning  Goal: Discharge to home or other facility with appropriate resources  Outcome: Progressing  Flowsheets (Taken 6/8/2025 0011)  Discharge to home or other facility with appropriate resources:   Identify barriers to discharge with patient and caregiver   Arrange for needed discharge resources and transportation as appropriate   Identify discharge learning needs (meds, wound care, etc)   Arrange for interpreters to assist at discharge as needed   Refer to discharge planning if patient needs post-hospital services based on physician order or complex needs related to functional status, cognitive ability or social support system     Problem: Pain  Goal: Verbalizes/displays adequate comfort level or baseline comfort level  6/8/2025 0011 by Clarisa Duran, RN  Outcome: Progressing  Flowsheets (Taken 6/8/2025 0011)  Verbalizes/displays adequate comfort level or baseline comfort level:   Encourage patient to monitor pain and request assistance   Assess pain using appropriate pain scale   Administer analgesics based on type and severity of pain and evaluate response   Implement non-pharmacological measures as appropriate and evaluate response   Consider cultural and social influences on pain and pain management  6/7/2025 1019 by Brice Ruggiero, RN  Outcome: Progressing  Flowsheets (Taken 6/7/2025 1019)  Verbalizes/displays adequate comfort level or baseline comfort level:   Encourage patient to monitor pain and request assistance   Assess pain using appropriate pain scale     Problem: Cardiovascular - Adult  Goal: Maintains optimal cardiac output and hemodynamic stability  Outcome: Progressing  Flowsheets (Taken 6/8/2025 0011)  Maintains optimal cardiac output and hemodynamic stability:   Monitor blood pressure and heart rate   Monitor urine output and notify Licensed Independent Practitioner for values outside of normal range   Assess for signs of decreased cardiac 
stability and optimal renal function maintained  6/8/2025 1100 by Genevieve Hare RN  Outcome: Progressing  Flowsheets (Taken 6/8/2025 0805)  Hemodynamic stability and optimal renal function maintained: Monitor labs and assess for signs and symptoms of volume excess or deficit  6/8/2025 0011 by Clarisa Duran RN  Outcome: Progressing  Flowsheets (Taken 6/8/2025 0011)  Hemodynamic stability and optimal renal function maintained:   Monitor labs and assess for signs and symptoms of volume excess or deficit   Monitor intake, output and patient weight   Monitor response to interventions for patient's volume status, including labs, urine output, blood pressure (other measures as available)  Goal: Glucose maintained within prescribed range  6/8/2025 1100 by Genevieve Hare RN  Outcome: Progressing  Flowsheets (Taken 6/8/2025 0805)  Glucose maintained within prescribed range: Monitor blood glucose as ordered  6/8/2025 0011 by Clarisa Duran RN  Outcome: Progressing  Flowsheets (Taken 6/8/2025 0011)  Glucose maintained within prescribed range:   Monitor blood glucose as ordered   Assess for signs and symptoms of hyperglycemia and hypoglycemia     Problem: Safety - Adult  Goal: Free from fall injury  6/8/2025 1100 by Genevieve Hare RN  Outcome: Progressing  Flowsheets (Taken 6/8/2025 1058)  Free From Fall Injury: Instruct family/caregiver on patient safety  6/8/2025 0011 by Clarisa Duran RN  Outcome: Progressing  Flowsheets (Taken 6/8/2025 0011)  Free From Fall Injury:   Instruct family/caregiver on patient safety   Based on caregiver fall risk screen, instruct family/caregiver to ask for assistance with transferring infant if caregiver noted to have fall risk factors     
Progressing  Flowsheets (Taken 6/8/2025 1053)  Free From Fall Injury: Instruct family/caregiver on patient safety

## 2025-06-09 NOTE — DISCHARGE INSTRUCTIONS
in our hands or feet or shortness of breath while lying flat in bed.  Please call your doctor as soon as you notice any of these symptoms; do not wait until your next office visit.        The discharge information has been reviewed with the patient.  The patient verbalized understanding.  Discharge medications reviewed with the patient and appropriate educational materials and side effects teaching were provided.  ___________________________________________________________________________________________________________________________________  Patient armband removed and shredded.

## 2025-06-09 NOTE — PROGRESS NOTES
[facility]               [address] [place] [address]                [phone]      6/9/2025      RE: Osmany Bocanegra      To Whom it May Concern:      This is to certify that Osmany Bocanegra was admitted to [facility] from 6/5/2025 - 6/9/2025.  He may may return to work on 6/19/2025      Please feel free to contact my office if you have any questions or concerns.  Thank you for your assistance in this matter.    Sincerely,        César Dan MD  [facility]  [facility]  [phone]  
 completed the initial Spiritual Assessment of the patient, and offered Pastoral Care support to the patient in bed 10 of the emergency room due to acute pyelonephritis where he will be admitted to the hospital.  Patient is not active in a local [employer]oro[employer]ism at this time..  There is no advance directive on file for this patient. Patient does not have any Religion/cultural needs that will affect patient’s preferences in health care. Chaplains will continue to follow and will provide pastoral care on an as needed/requested basis.    Spiritual Health History and Assessment/Progress Note  [facility]    Initial Encounter, Crisis (iv-sa-eje), Follow up (acute Pyelonephritis),  ,      Name: Osmany Bocanegra MRN: [id number]    Age: 54 y.o.     Sex: male   Language: English   Lutheran: Muslim   Acute pyelonephritis     Date: 6/6/2025            Total Time Calculated: 10 min              Spiritual Assessment began in [facility] 2S TELEMETRY        Referral/Consult From: Rounding   Encounter Overview/Reason: Initial Encounter, Crisis (iv-sa-eje)  Service Provided For: Patient    Agnes, Belief, Meaning:   Patient Other: is not connected to a agnes community now.  Family/Friends No family/friends present      Importance and Influence:  Patient has no beliefs influential to healthcare decision-making identified during this visit  Family/Friends No family/friends present    Community:  Patient Other: unknown community support  Family/Friends No family/friends present    Assessment and Plan of Care:     Patient Interventions include:   Family/Friends Interventions include: No family/friends present    Patient Plan of Care: Spiritual Care available upon further referral  Family/Friends Plan of Care: No family/friends present    Electronically signed by Ector Rees Jr., [facility] on 6/6/2025 at 9:40 AM     
4 Eyes Skin Assessment     NAME:  Osmany Bocanegra  YOB: 1970  MEDICAL RECORD NUMBER:  [id number]    The patient is being assessed for  Shift Handoff    I agree that at least one RN has performed a thorough Head to Toe Skin Assessment on the patient. ALL assessment sites listed below have been assessed.      Areas assessed by both nurses:    Head, Face, Ears, Shoulders, Back, Chest, Arms, Elbows, Hands, Sacrum. Buttock, Coccyx, Ischium, Legs. Feet and Heels, and Under Medical Devices         Does the Patient have a Wound? No noted wound(s)       Fred Prevention initiated by RN: No  Wound Care Orders initiated by RN: No    Pressure Injury (Stage 3,4, Unstageable, DTI, NWPT, and Complex wounds) if present, place Wound referral order by RN under : No    New Ostomies, if present place, Ostomy referral order under : No     Nurse 1 eSignature: Electronically signed by Genevieve Hare RN on 6/6/25 at 7:44 PM EDT    **SHARE this note so that the co-signing nurse can place an eSignature**    Nurse 2 eSignature: {Esignature:[id number]}    
4 Eyes Skin Assessment     NAME:  Osmany Bocanegra  YOB: 1970  MEDICAL RECORD NUMBER:  [id number]    The patient is being assessed for  Shift Handoff    I agree that at least one RN has performed a thorough Head to Toe Skin Assessment on the patient. ALL assessment sites listed below have been assessed.      Areas assessed by both nurses:    Head, Face, Ears, Shoulders, Back, Chest, Arms, Elbows, Hands, Sacrum. Buttock, Coccyx, Ischium, Legs. Feet and Heels, and Under Medical Devices         Does the Patient have a Wound? No noted wound(s)       Fred Prevention initiated by RN: Yes  Wound Care Orders initiated by RN: No    Pressure Injury (Stage 3,4, Unstageable, DTI, NWPT, and Complex wounds) if present, place Wound referral order by RN under : No    New Ostomies, if present place, Ostomy referral order under : No     Nurse 1 eSignature: Electronically signed by Clarisa Duran RN on 6/9/25 at 7:52 AM EDT    **SHARE this note so that the co-signing nurse can place an eSignature**    Nurse 2 eSignature: Electronically signed by Kevin Kaba RN on 6/9/25 at 9:19 AM EDT    
MD placed discharge orders. Discharge orders discussed and reviewed with pt. Pt voiced understanding. Work note given to pt. IV's removed.   
[facility]   Pharmacy Pharmacokinetic Monitoring Service - Vancomycin    Indication: Urinary Tract Infection  Target Concentration: Goal AUC/-600 mg*hr/L  Day of Therapy: 1  Additional Antimicrobials: Piperacillin/Tazobactam    Pertinent Laboratory Values:   Temp: 100.3 °F (37.9 °C), Weight - Scale: 106.6 kg (235 lb)  Recent Labs     06/05/25  2106   CREATININE 0.97   BUN 12   WBC 14.7*   PROCAL 0.23       Estimated Creatinine Clearance: 112 mL/min (based on SCr of 0.97 mg/dL).    Pertinent Cultures:  Culture Date Source Results   6/6 blood pending       Assessment:  Date/Time Current Dose Concentration Timing of Concentration (h) AUC   6/6 2500mg x1  1250mg q12h - - -     Note: Serum concentrations collected for AUC dosing may appear elevated if collected in close proximity to the dose administered, this is not necessarily an indication of toxicity    Patient started on vancomycin loading dose of 2.5g(~23mg/kg) for suspected UTI/sepsis.  Based on bayesian software, calculated t1/2 is ~10 hours, recommend 1250mg(~12mg/kg) every 12 hours to achieve an estimated steady state AUC24h of 469 with a trough of 13.3.     Plan:  Patient started on loading dose of 2500mg  Continue current dose of 1250mg every 12 hours  Renal labs as indicated   Vancomycin concentration ordered for AM labs tomorrow  Pharmacy will continue to monitor patient and adjust therapy as indicated    Thank you for the consult,  Stephanie Haas RPH  6/6/2025   
acetaminophen (TYLENOL) suppository 650 mg  650 mg Rectal Q6H PRN    potassium chloride 10 mEq/100 mL IVPB (Peripheral Line)  10 mEq IntraVENous PRN    bisacodyl (DULCOLAX) suppository 10 mg  10 mg Rectal Daily PRN    piperacillin-tazobactam (ZOSYN) 3,375 mg in sodium chloride 0.9 % 50 mL IVPB (addEASE)  3,375 mg IntraVENous Q8H    morphine (PF) injection 2 mg  2 mg IntraVENous Q3H PRN    diazePAM (VALIUM) injection 5 mg  5 mg IntraVENous Q12H PRN    traMADol (ULTRAM) tablet 50 mg  50 mg Oral Q4H PRN    oxyCODONE (ROXICODONE) immediate release tablet 5 mg  5 mg Oral Q4H PRN    diphenhydrAMINE (BENADRYL) injection 25 mg  25 mg IntraVENous Q6H PRN    lactobacillus (CULTURELLE) capsule 1 capsule  1 capsule Oral Daily with breakfast        Labs:    Recent Results (from the past 24 hours)   Hemoglobin A1C    Collection Time: 06/06/25  2:50 PM   Result Value Ref Range    Hemoglobin A1C 7.0 (H) 4.2 - 5.6 %    Estimated Avg Glucose 154 mg/dL   Calcium, Ionized    Collection Time: 06/06/25  2:50 PM   Result Value Ref Range    Calcium, Ionized 1.13 (L) 1.15 - 1.33 MMOL/L         Signed By: César Dan MD          
Collection Time: 06/08/25  4:31 AM   Result Value Ref Range    Sodium 134 (L) 136 - 145 mmol/L    Potassium 3.7 3.5 - 5.5 mmol/L    Chloride 100 98 - 107 mmol/L    CO2 19 (L) 21 - 32 mmol/L    Anion Gap 14 3.0 - 18.0 mmol/L    Glucose 132 (H) 74 - 108 mg/dL    BUN 16 6 - 23 MG/DL    Creatinine 0.95 0.6 - 1.3 MG/DL    BUN/Creatinine Ratio 17 12 - 20      Est, Glom Filt Rate >90 >60 ml/min/1.73m2    Calcium 8.8 8.5 - 10.1 MG/DL   CBC with Auto Differential    Collection Time: 06/08/25  4:31 AM   Result Value Ref Range    WBC 13.0 4.6 - 13.2 K/uL    RBC 4.55 4.35 - 5.65 M/uL    Hemoglobin 13.4 13.0 - 16.0 g/dL    Hematocrit 40.8 36.0 - 48.0 %    MCV 89.7 78.0 - 100.0 FL    MCH 29.5 24.0 - 34.0 PG    MCHC 32.8 31.0 - 37.0 g/dL    RDW 12.7 11.6 - 14.5 %    Platelets 197 135 - 420 K/uL    MPV 10.0 9.2 - 11.8 FL    Nucleated RBCs 0.0 0  WBC    nRBC 0.00 0.00 - 0.01 K/uL    Neutrophils % 81.1 (H) 40.0 - 73.0 %    Lymphocytes % 8.3 (L) 21.0 - 52.0 %    Monocytes % 6.2 3.0 - 10.0 %    Eosinophils % 2.9 0.0 - 5.0 %    Basophils % 0.3 0.0 - 2.0 %    Immature Granulocytes % 1.2 (H) 0.0 - 0.5 %    Neutrophils Absolute 10.54 (H) 1.80 - 8.00 K/UL    Lymphocytes Absolute 1.08 0.90 - 3.60 K/UL    Monocytes Absolute 0.80 0.05 - 1.20 K/UL    Eosinophils Absolute 0.38 0.00 - 0.40 K/UL    Basophils Absolute 0.04 0.00 - 0.10 K/UL    Immature Granulocytes Absolute 0.15 (H) 0.00 - 0.04 K/UL    Differential Type AUTOMATED           Signed By: César Dan MD          
for age   Neurologic:  No gross focal sensory abnormalities; equal muscle strength to upper and lower extremities. Speech appropirate. Cranial nerves intact   Psychiatric:   appropriate and interactive.       Labs: Results:   Chemistry Recent Labs     06/05/25 2106   *   K 4.3   CL 96*   CO2 22   BUN 12   GLOB 3.7      CBC w/Diff Recent Labs     06/05/25 2106   WBC 14.7*   RBC 5.07   HGB 15.5   HCT 45.1               No results found for: \"SDES\" No components found for: \"CULT\"     Results       Procedure Component Value Units Date/Time    Culture, Urine [[id number]] Collected: 06/06/25 1910    Order Status: Sent Specimen: Urine, clean catch Updated: 06/06/25 2332    Blood Culture 2 [[id number]] Collected: 06/05/25 2215    Order Status: Completed Specimen: Blood Updated: 06/07/25 0615     Special Requests NO SPECIAL REQUESTS        Culture NO GROWTH 2 DAYS       Blood Culture 1 [[id number]] Collected: 06/05/25 2200    Order Status: Completed Specimen: Blood Updated: 06/07/25 0615     Special Requests NO SPECIAL REQUESTS        Culture NO GROWTH 2 DAYS       C.trachomatis N.gonorrhoeae DNA [[id number]] Collected: 06/05/25 2056    Order Status: Completed Specimen: Miscellaneous sample Updated: 06/06/25 1514     Chlamydia trachomatis, DESIRAE Negative        Neisseria Gonorrhoeae, DESIRAE Negative        Comment: Testing performed by nucleic acid amplification method.        Specimen source Urine                     Imaging:     All available imaging since presentation reviewed as per EPIC    
  Blood Culture 1    Collection Time: 06/05/25 10:00 PM    Specimen: Blood   Result Value Ref Range    Special Requests NO SPECIAL REQUESTS      Culture NO GROWTH AFTER 8 HOURS     Blood Culture 2    Collection Time: 06/05/25 10:15 PM    Specimen: Blood   Result Value Ref Range    Special Requests NO SPECIAL REQUESTS      Culture NO GROWTH AFTER 8 HOURS     POCT lactic acid (lactate)    Collection Time: 06/05/25 10:23 PM   Result Value Ref Range    POC Lactic Acid 2.31 (HH) 0.40 - 2.00 mmol/L   EKG 12 Lead (Abn HR)    Collection Time: 06/05/25 10:27 PM   Result Value Ref Range    Ventricular Rate 119 BPM    Atrial Rate 119 BPM    P-R Interval 134 ms    QRS Duration 70 ms    Q-T Interval 304 ms    QTc Calculation (Bazett) 427 ms    P Axis 47 degrees    R Axis -6 degrees    T Axis 46 degrees    Diagnosis       Sinus tachycardia  Possible Left atrial enlargement  Borderline ECG  No previous ECGs available     POCT lactic acid (lactate)    Collection Time: 06/06/25 12:49 AM   Result Value Ref Range    POC Lactic Acid 2.42 (HH) 0.40 - 2.00 mmol/L   POCT lactic acid (lactate)    Collection Time: 06/06/25  2:23 AM   Result Value Ref Range    POC Lactic Acid 2.66 (HH) 0.40 - 2.00 mmol/L   POCT lactic acid (lactate)    Collection Time: 06/06/25  5:06 AM   Result Value Ref Range    POC Lactic Acid 2.85 (HH) 0.40 - 2.00 mmol/L         Signed By: César Dan MD

## 2025-06-09 NOTE — CARE COORDINATION
D/C order noted for today. Orders reviewed. No needs identified at this time. SW remains available if needed.       Gabrielle Quezada MSW  Case Management    
  One/Two Story Residence Two story   # of Interior Steps 13   Interior Rails Both   Lift Chair Available No   History of falls? 0   Services At/After Discharge   Transition of Care Consult (CM Consult) Discharge Planning   Services At/After Discharge None   [place] Resource Information Provided? No   Mode of Transport at Discharge Other (see comment)  (Father)   Confirm Follow Up Transport Self   Condition of Participation: Discharge Planning   The Plan for Transition of Care is related to the following treatment goals: Dispo home with necessary resources   The Patient and/or Patient Representative was provided with a Choice of Provider? Patient   The Patient and/Or Patient Representative agree with the Discharge Plan? Yes   Freedom of Choice list was provided with basic dialogue that supports the patient's individualized plan of care/goals, treatment preferences, and shares the quality data associated with the providers?  No     Anabel ROMO RN  Case Management  [phone]

## 2025-06-11 NOTE — DISCHARGE SUMMARY
Discharge Summary    Patient: Osmany Bocanegra MRN: [id number]  CSN: [id number]    YOB: 1970  Age: 54 y.o.  Sex: male    DOA: 6/5/2025 LOS:  LOS: 3 days   Discharge Date: 6/9/2025     Admission Diagnoses: Epididymitis [N45.1]  Acute pyelonephritis [N10]  Acute cystitis with hematuria [N30.01]  Severe sepsis (HCC) [A41.9, R65.20]    Discharge Diagnoses:    Epididymitis/orchitis  Hypocalcemia  Leukocytosis  Lactic acidosis  Electrolyte abnormalities  Hyperglycemia    Discharge Condition: Stable    PHYSICAL EXAM  Visit Vitals  BP (!) 131/91   Pulse 90   Temp 98.1 °F (36.7 °C) (Oral)   Resp 18   Ht 1.829 m (6')   Wt 106.6 kg (235 lb)   SpO2 96%   BMI 31.87 kg/m²       General: In NAD.  Nontoxic-appearing.  HEENT: NCAT.  Sclerae anicteric, EOMI.    Lungs:  Clear, no wheezes.  No accessory muscle use.  Heart:  RRR.  Abdomen: Soft, NT/ND.  Extremities: Warm, no edema or ischemia.  Psych:   Mood normal.  Neurologic:  Awake and alert, moves extremities spontaneously.  Motor grossly nonfocal.    Hospital Course:   See admission H&P for full details of HPI.    Patient was admitted to the hospital after presenting to the ED for evaluation of scrotal pain and swelling.  Imaging was concerning for epididymoorchitis but there was no obvious evidence for abscess or other problem requiring surgical intervention.  IV antibiotic therapy was initiated empirically and an infectious diseases evaluation was obtained.  Patient has continued to improve with antibiotics and a repeat scrotal ultrasound was performed, again, showing no evidence for abscess.  Infectious diseases has recommended continued antibiotics to complete course of therapy and patient is felt to have met maximum benefit of hospitalization and is medically stable to discharge home with outpatient follow-up as advised.    Consults:   Factious diseases    Significant Diagnostic Studies/Procedures:   EXAMINATION: CT abdomen/pelvis with contrast     INDICATION:

## (undated) DEVICE — TRAY PREP DRY W/ PREM GLV 2 APPL 6 SPNG 2 UNDPD 1 OVERWRAP

## (undated) DEVICE — GOWN,PREVENTION PLUS,XLN/XL,ST,24/CS: Brand: MEDLINE

## (undated) DEVICE — STER SINGLE BASIN SET W/BOWLS: Brand: CARDINAL HEALTH

## (undated) DEVICE — MEDI-VAC NON-CONDUCTIVE SUCTION TUBING: Brand: CARDINAL HEALTH

## (undated) DEVICE — 3M™ BAIR PAWS FLEX™ WARMING GOWN, STANDARD, 20 PER CASE 81003: Brand: BAIR PAWS™

## (undated) DEVICE — TUBING IRRIG L77IN DIA0.241IN L BOR FOR CYSTO W/ NVENT

## (undated) DEVICE — BAG DRAINAGE CUST DISP

## (undated) DEVICE — KIT CLN UP BON SECOURS MARYV

## (undated) DEVICE — FLEX ADVANTAGE 3000CC: Brand: FLEX ADVANTAGE

## (undated) DEVICE — REM POLYHESIVE ADULT PATIENT RETURN ELECTRODE: Brand: VALLEYLAB

## (undated) DEVICE — SOLUTION IRRIG 3000ML H2O STRL BAG

## (undated) DEVICE — SUTURE VCRL SZ 3-0 L27IN ABSRB UD L26MM SH 1/2 CIR J416H

## (undated) DEVICE — Z DUP USE 2565107 PACK SURG PROC LEG CYSTO T-DRAPE REINF TBL CVR HND TWL

## (undated) DEVICE — GAUZE SPONGES,16 PLY: Brand: CURITY

## (undated) DEVICE — URETHRAL DILATOR SET: Brand: COOK

## (undated) DEVICE — CATHETER URETH 16FR BLLN 5CC SIL ALLY W/ SIL HYDRGEL 2 W F

## (undated) DEVICE — KIT PROC CYSTO 40X60IN NONABSORBABLE 40X30IN SAHARA